# Patient Record
Sex: FEMALE | Race: BLACK OR AFRICAN AMERICAN | URBAN - METROPOLITAN AREA
[De-identification: names, ages, dates, MRNs, and addresses within clinical notes are randomized per-mention and may not be internally consistent; named-entity substitution may affect disease eponyms.]

---

## 2017-02-13 ENCOUNTER — OFFICE VISIT (OUTPATIENT)
Dept: FAMILY MEDICINE CLINIC | Age: 37
End: 2017-02-13

## 2017-02-13 VITALS
RESPIRATION RATE: 20 BRPM | SYSTOLIC BLOOD PRESSURE: 100 MMHG | DIASTOLIC BLOOD PRESSURE: 72 MMHG | HEIGHT: 58 IN | WEIGHT: 158.25 LBS | BODY MASS INDEX: 33.22 KG/M2 | TEMPERATURE: 98.2 F | OXYGEN SATURATION: 98 % | HEART RATE: 80 BPM

## 2017-02-13 DIAGNOSIS — J02.9 SORE THROAT: ICD-10-CM

## 2017-02-13 DIAGNOSIS — J40 BRONCHITIS: Primary | ICD-10-CM

## 2017-02-13 RX ORDER — BENZONATATE 100 MG/1
100 CAPSULE ORAL
Qty: 60 CAP | Refills: 1 | Status: SHIPPED | OUTPATIENT
Start: 2017-02-13 | End: 2017-02-20

## 2017-02-13 RX ORDER — LORATADINE 10 MG/1
10 TABLET ORAL DAILY
Qty: 30 TAB | Refills: 11 | Status: SHIPPED | OUTPATIENT
Start: 2017-02-13 | End: 2018-02-08

## 2017-02-13 RX ORDER — CEFDINIR 300 MG/1
300 CAPSULE ORAL 2 TIMES DAILY
Qty: 20 CAP | Refills: 0 | Status: SHIPPED | OUTPATIENT
Start: 2017-02-13 | End: 2017-02-23

## 2017-02-13 NOTE — MR AVS SNAPSHOT
Visit Information Date & Time Provider Department Dept. Phone Encounter #  
 2/13/2017 11:20 AM Mela Paiz MD [address] [facility] [phone] [id number] Follow-up Instructions Return if symptoms worsen or fail to improve. Upcoming Health Maintenance Date Due  
 PAP AKA CERVICAL CYTOLOGY 9/21/2003 INFLUENZA AGE 9 TO ADULT 8/1/2016 DTaP/Tdap/Td series (2 - Td) 9/16/2023 Allergies as of 2/13/2017  Review Complete On: 2/13/2017 By: Mela Paiz MD  
 No Known Allergies Current Immunizations  Reviewed on 11/25/2013 Name Date Tdap 9/16/2013 Not reviewed this visit You Were Diagnosed With   
  
 Codes Comments Bronchitis    -  Primary ICD-10-CM: A45 ICD-9-CM: 528 Sore throat     ICD-10-CM: J02.9 ICD-9-CM: 928 Vitals BP Pulse Temp Resp Height(growth percentile) Weight(growth percentile) 100/72 (BP 1 Location: Left arm, BP Patient Position: Sitting) 80 98.2 °F (36.8 °C) (Oral) 20 4' 10\" (1.473 m) 158 lb 4 oz (71.8 kg) LMP SpO2 BMI OB Status Smoking Status 01/30/2017 (Approximate) 98% 33.07 kg/m2 Having regular periods Never Smoker Vitals History BMI and BSA Data Body Mass Index Body Surface Area 33.07 kg/m 2 1.71 m 2 Preferred Pharmacy Pharmacy Name Phone [address] Your Updated Medication List  
  
   
This list is accurate as of: 2/13/17 11:51 AM.  Always use your most recent med list.  
  
  
  
  
 benzonatate 100 mg capsule Commonly known as:  TESSALON PERLES Take 1 Cap by mouth three (3) times daily as needed for Cough for up to 7 days. cefdinir 300 mg capsule Commonly known as:  OMNICEF Take 1 Cap by mouth two (2) times a day for 10 days. loratadine 10 mg tablet Commonly known as:  Michaell Organ Take 1 Tab by mouth daily for 360 days. promethazine-codeine 6.25-10 mg/5 mL syrup Commonly known as:  PHENERGAN with CODEINE Take 5 mL by mouth every six (6) hours as needed for Cough. Prescriptions Sent to Pharmacy Refills  
 benzonatate (TESSALON PERLES) 100 mg capsule 1 Sig: Take 1 Cap by mouth three (3) times daily as needed for Cough for up to 7 days. Class: Normal  
 Pharmacy: [address] Ph #: [phone] Route: Oral  
 cefdinir (OMNICEF) 300 mg capsule 0 Sig: Take 1 Cap by mouth two (2) times a day for 10 days. Class: Normal  
 Pharmacy: [address] Ph #: [phone] Route: Oral  
 loratadine (CLARITIN) 10 mg tablet 11 Sig: Take 1 Tab by mouth daily for 360 days. Class: Normal  
 Pharmacy: [address] Ph #: [phone] Route: Oral  
  
We Performed the Following AMB POC RAPID STREP A [75965 CPT(R)] Follow-up Instructions Return if symptoms worsen or fail to improve. Introducing [facility] & HEALTH SERVICES! [employer] introduces Gummii patient portal. Now you can access parts of your medical record, email your doctor's office, and request medication refills online. 1. In your internet browser, go to [email or web address] 2. Click on the First Time User? Click Here link in the Sign In box. You will see the New Member Sign Up page. 3. Enter your Gummii Access Code exactly as it appears below. You will not need to use this code after youve completed the sign-up process. If you do not sign up before the expiration date, you must request a new code. · Gummii Access Code: [email or web address] Expires: 5/14/2017 11:51 AM 
 
4. Enter the last four digits of your Social Security Number (xxxx) and Date of Birth (mm/dd/yyyy) as indicated and click Submit.  You will be taken to the next sign-up page. 5. Create a leaselock ID. This will be your leaselock login ID and cannot be changed, so think of one that is secure and easy to remember. 6. Create a leaselock password. You can change your password at any time. 7. Enter your Password Reset Question and Answer. This can be used at a later time if you forget your password. 8. Enter your e-mail address. You will receive e-mail notification when new information is available in [address] 9. Click Sign Up. You can now view and download portions of your medical record. 10. Click the Download Summary menu link to download a portable copy of your medical information. If you have questions, please visit the Frequently Asked Questions section of the leaselock website. Remember, leaselock is NOT to be used for urgent needs. For medical emergencies, dial 911. Now available from your iPhone and Android! Please provide this summary of care documentation to your next provider. Your primary care clinician is listed as SARAH RIGGS. If you have any questions after today's visit, please call [phone].

## 2017-02-13 NOTE — PROGRESS NOTES
1. Have you been to the ER, urgent care clinic since your last visit? Hospitalized since your last visit? No       2. Have you seen or consulted any other health care providers outside of the [address] [place] [address] since your last visit? Include any pap smears or colon screening. No  Chief Complaint   Patient presents with    Cold Symptoms     cough, cold symptoms fever of/on X couple days c/o sore throat & ears hurting     Chief Complaint   Patient presents with    Cold Symptoms     cough, cold symptoms fever of/on X couple days c/o sore throat & ears hurting     she is a 31y.o. year old female who presents for evalution. Reviewed PmHx, RxHx, FmHx, SocHx, AllgHx and updated and dated in the chart. Patient Active Problem List    Diagnosis    Down syndrome     Lives with mother         Review of Systems - negative except as listed above in the HPI    Objective:     Vitals:    02/13/17 1135   BP: 100/72   Pulse: 80   Resp: 20   Temp: 98.2 °F (36.8 °C)   TempSrc: Oral   SpO2: 98%   Weight: 158 lb 4 oz (71.8 kg)   Height: 4' 10\" (1.473 m)     Physical Examination: General appearance - alert, well appearing, and in no distress  Nose - purulent rhinorrhea  Chest - clear to auscultation, no wheezes, rales or rhonchi, symmetric air entry  Heart - normal rate, regular rhythm, normal S1, S2, no murmurs, rubs, clicks or gallops      Assessment/ Plan:   Hector Moore was seen today for cold symptoms. Diagnoses and all orders for this visit:    Bronchitis  -     benzonatate (TESSALON PERLES) 100 mg capsule; Take 1 Cap by mouth three (3) times daily as needed for Cough for up to 7 days. -     cefdinir (OMNICEF) 300 mg capsule; Take 1 Cap by mouth two (2) times a day for 10 days. -     loratadine (CLARITIN) 10 mg tablet; Take 1 Tab by mouth daily for 360 days. Sore throat  -     AMB POC RAPID STREP A-neg       Follow-up Disposition:  Return if symptoms worsen or fail to improve.     I have discussed the diagnosis with the patient and the intended plan as seen in the above orders. The patient understands and agrees with the plan. The patient has received an after-visit summary and questions were answered concerning future plans. Medication Side Effects and Warnings were discussed with patient  Patient Labs were reviewed and or requested:  Patient Past Records were reviewed and or requested    Cat Graves M.D. There are no Patient Instructions on file for this visit.

## 2017-03-23 ENCOUNTER — OFFICE VISIT (OUTPATIENT)
Dept: FAMILY MEDICINE CLINIC | Age: 37
End: 2017-03-23

## 2017-03-23 VITALS
DIASTOLIC BLOOD PRESSURE: 58 MMHG | HEART RATE: 76 BPM | RESPIRATION RATE: 18 BRPM | OXYGEN SATURATION: 98 % | TEMPERATURE: 98.2 F | HEIGHT: 58 IN | BODY MASS INDEX: 33.69 KG/M2 | WEIGHT: 160.5 LBS | SYSTOLIC BLOOD PRESSURE: 96 MMHG

## 2017-03-23 DIAGNOSIS — L21.9 SEBORRHEIC DERMATITIS: Primary | ICD-10-CM

## 2017-03-23 RX ORDER — KETOCONAZOLE 20 MG/ML
SHAMPOO TOPICAL
Qty: 1 BOTTLE | Refills: 3 | Status: SHIPPED | OUTPATIENT
Start: 2017-03-23 | End: 2018-09-19 | Stop reason: SDUPTHER

## 2017-03-23 NOTE — PROGRESS NOTES
Chief Complaint   Patient presents with    Hair/Scalp Problem     Dry Scalp/Itching Flaky Scalp     1. Have you been to the ER, urgent care clinic since your last visit? Hospitalized since your last visit? No    2. Have you seen or consulted any other health care providers outside of the [facility] since your last visit? Include any pap smears or colon screening. No  Chief Complaint   Patient presents with    Hair/Scalp Problem     Dry Scalp/Itching Flaky Scalp     she is a 31y.o. year old female who presents for evalution. Reviewed PmHx, RxHx, FmHx, SocHx, AllgHx and updated and dated in the chart. Patient Active Problem List    Diagnosis    Down syndrome     Lives with mother         Review of Systems - negative except as listed above in the HPI    Objective:     Vitals:    03/23/17 0821   BP: 96/58   Pulse: 76   Resp: 18   Temp: 98.2 °F (36.8 °C)   TempSrc: Oral   SpO2: 98%   Weight: 160 lb 8 oz (72.8 kg)   Height: 4' 10\" (1.473 m)     Physical Examination: General appearance - alert, well appearing, and in no distress  Skin - scalp with dry scaly skin      Assessment/ Plan:   Odilia Li was seen today for hair/scalp problem. Diagnoses and all orders for this visit:    Seborrheic dermatitis  -     ketoconazole (NIZORAL) 2 % shampoo; Apply to scalp daily and let stand for 10-15 min and then rinse       Follow-up Disposition:  Return if symptoms worsen or fail to improve. I have discussed the diagnosis with the patient and the intended plan as seen in the above orders. The patient understands and agrees with the plan. The patient has received an after-visit summary and questions were answered concerning future plans. Medication Side Effects and Warnings were discussed with patient  Patient Labs were reviewed and or requested:  Patient Past Records were reviewed and or requested    Lizzie Santos M.D. There are no Patient Instructions on file for this visit.

## 2017-03-23 NOTE — MR AVS SNAPSHOT
Visit Information Date & Time Provider Department Dept. Phone Encounter #  
 3/23/2017  7:45 AM Tania Herring MD [address] [facility] [phone] [id number] Follow-up Instructions Return if symptoms worsen or fail to improve. Upcoming Health Maintenance Date Due  
 PAP AKA CERVICAL CYTOLOGY 3/23/2020 DTaP/Tdap/Td series (2 - Td) 9/16/2023 Allergies as of 3/23/2017  Review Complete On: 3/23/2017 By: Tania Herring MD  
 No Known Allergies Current Immunizations  Reviewed on 11/25/2013 Name Date Tdap 9/16/2013 Not reviewed this visit You Were Diagnosed With   
  
 Codes Comments Seborrheic dermatitis    -  Primary ICD-10-CM: L21.9 ICD-9-CM: 690.10 Vitals BP Pulse Temp Resp Height(growth percentile) Weight(growth percentile) 96/58 76 98.2 °F (36.8 °C) (Oral) 18 4' 10\" (1.473 m) 160 lb 8 oz (72.8 kg) SpO2 BMI OB Status Smoking Status 98% 33.54 kg/m2 Having regular periods Never Smoker Vitals History BMI and BSA Data Body Mass Index Body Surface Area  
 33.54 kg/m 2 1.73 m 2 Preferred Pharmacy Pharmacy Name Phone [address] Your Updated Medication List  
  
   
This list is accurate as of: 3/23/17  8:37 AM.  Always use your most recent med list.  
  
  
  
  
 ketoconazole 2 % shampoo Commonly known as:  NIZORAL Apply to scalp daily and let stand for 10-15 min and then rinse  
  
 loratadine 10 mg tablet Commonly known as:  Ferrari Petri Take 1 Tab by mouth daily for 360 days. promethazine-codeine 6.25-10 mg/5 mL syrup Commonly known as:  PHENERGAN with CODEINE Take 5 mL by mouth every six (6) hours as needed for Cough. Prescriptions Sent to Pharmacy Refills  
 ketoconazole (NIZORAL) 2 % shampoo 3 Sig: Apply to scalp daily and let stand for 10-15 min and then rinse Class: Normal  
 Pharmacy: [facility] [address], [address] #: [phone] Follow-up Instructions Return if symptoms worsen or fail to improve. Introducing [facility] SERVICES! [employer]il[employer] introduces Trendzo patient portal. Now you can access parts of your medical record, email your doctor's office, and request medication refills online. 1. In your internet browser, go to [email or web address] Bake [email or web address] 2. Click on the First Time User? Click Here link in the Sign In box. You will see the New Member Sign Up page. 3. Enter your Trendzo Access Code exactly as it appears below. You will not need to use this code after youve completed the sign-up process. If you do not sign up before the expiration date, you must request a new code. · Trendzo Access Code: [email or web address] Expires: 5/14/2017 12:51 PM 
 
4. Enter the last four digits of your Social Security Number (xxxx) and Date of Birth (mm/dd/yyyy) as indicated and click Submit. You will be taken to the next sign-up page. 5. Create a Trendzo ID. This will be your Trendzo login ID and cannot be changed, so think of one that is secure and easy to remember. 6. Create a Trendzo password. You can change your password at any time. 7. Enter your Password Reset Question and Answer. This can be used at a later time if you forget your password. 8. Enter your e-mail address. You will receive e-mail notification when new information is available in [address] 9. Click Sign Up. You can now view and download portions of your medical record. 10. Click the Download Summary menu link to download a portable copy of your medical information. If you have questions, please visit the Frequently Asked Questions section of the Trendzo website. Remember, Trendzo is NOT to be used for urgent needs. For medical emergencies, dial 911. Now available from your iPhone and Android! Please provide this summary of care documentation to your next provider. Your primary care clinician is listed as SARAH RIGGS. If you have any questions after today's visit, please call [phone].

## 2017-03-28 ENCOUNTER — TELEPHONE (OUTPATIENT)
Dept: FAMILY MEDICINE CLINIC | Age: 37
End: 2017-03-28

## 2017-03-28 ENCOUNTER — DOCUMENTATION ONLY (OUTPATIENT)
Dept: FAMILY MEDICINE CLINIC | Age: 37
End: 2017-03-28

## 2017-03-28 NOTE — TELEPHONE ENCOUNTER
[address] would states that Nizoral shampoo is usually used upto 3 times/week. Would you like to change directions?

## 2018-09-19 ENCOUNTER — OFFICE VISIT (OUTPATIENT)
Dept: FAMILY MEDICINE CLINIC | Age: 38
End: 2018-09-19

## 2018-09-19 VITALS
BODY MASS INDEX: 36.31 KG/M2 | OXYGEN SATURATION: 95 % | TEMPERATURE: 98.5 F | HEIGHT: 58 IN | HEART RATE: 75 BPM | RESPIRATION RATE: 18 BRPM | DIASTOLIC BLOOD PRESSURE: 79 MMHG | SYSTOLIC BLOOD PRESSURE: 110 MMHG | WEIGHT: 173 LBS

## 2018-09-19 DIAGNOSIS — L21.9 SEBORRHEIC DERMATITIS: Primary | ICD-10-CM

## 2018-09-19 DIAGNOSIS — J06.9 UPPER RESPIRATORY TRACT INFECTION, UNSPECIFIED TYPE: ICD-10-CM

## 2018-09-19 DIAGNOSIS — Q90.9 DOWN SYNDROME: ICD-10-CM

## 2018-09-19 RX ORDER — LORATADINE 10 MG/1
10 TABLET ORAL DAILY
Qty: 30 TAB | Refills: 11 | Status: SHIPPED | OUTPATIENT
Start: 2018-09-19 | End: 2018-10-02

## 2018-09-19 RX ORDER — AZITHROMYCIN 250 MG/1
TABLET, FILM COATED ORAL
Qty: 6 TAB | Refills: 0 | Status: SHIPPED | OUTPATIENT
Start: 2018-09-19 | End: 2018-10-02

## 2018-09-19 RX ORDER — KETOCONAZOLE 20 MG/ML
SHAMPOO TOPICAL
Qty: 1 BOTTLE | Refills: 3 | Status: SHIPPED | OUTPATIENT
Start: 2018-09-19

## 2018-09-19 NOTE — MR AVS SNAPSHOT
[address] [address] [phone] Patient: Jennifer Montoya MRN:  [id number] Visit Information Date & Time Provider Department Dept. Phone Encounter #  
 9/19/2018  3:00 PM Little Rust MD 5900 [facility] [phone] [id number] Follow-up Instructions Return if symptoms worsen or fail to improve. Your Appointments 10/25/2018  8:00 AM  
PHYSICAL PRE OP with Little Rust MD  
[facility] App[facility] Note: CPE [facility] 09/19/2018  
 [address] [place] [address] [address] [address] [phone]  
  
   
 [address] [place] [address] [address] [address] Upcoming Health Maintenance Date Due Influenza Age 5 to Adult 4/30/2019* PAP AKA CERVICAL CYTOLOGY 3/23/2020 DTaP/Tdap/Td series (2 - Td) 9/16/2023 *Topic was postponed. The date shown is not the original due date. Allergies as of 9/19/2018  Review Complete On: 9/19/2018 By: Little Rust MD  
 No Known Allergies Current Immunizations  Reviewed on 11/25/2013 Name Date Tdap 9/16/2013 Not reviewed this visit You Were Diagnosed With   
  
 Codes Comments Seborrheic dermatitis    -  Primary ICD-10-CM: L21.9 ICD-9-CM: 690.10 Upper respiratory tract infection, unspecified type     ICD-10-CM: J06.9 ICD-9-CM: 465.9 Down syndrome     ICD-10-CM: Q90.9 ICD-9-CM: 123. 0 Vitals BP Pulse Temp Resp Height(growth percentile) Weight(growth percentile) 110/79 75 98.5 °F (36.9 °C) 18 4' 10\" (1.473 m) 173 lb (78.5 kg) SpO2 BMI OB Status Smoking Status 95% 36.16 kg/m2 Having regular periods Never Smoker Vitals History BMI and BSA Data Body Mass Index Body Surface Area  
 36.16 kg/m 2 1.79 m 2 Preferred Pharmacy Pharmacy Name Phone [address], [address] [facility] [address] [facility] [phone] Your Updated Medication List  
  
   
This list is accurate as of 9/19/18  3:59 PM.  Always use your most recent med list.  
  
  
  
  
 azithromycin 250 mg tablet Commonly known as:  Shona Friend Take two tablets today then one tablet daily  
  
 ketoconazole 2 % shampoo Commonly known as:  NIZORAL Apply to scalp daily and let stand for 10-15 min and then rinse  
  
 loratadine 10 mg tablet Commonly known as:  Mariana Dustman Take 1 Tab by mouth daily for 360 days. Prescriptions Sent to Pharmacy Refills  
 ketoconazole (NIZORAL) 2 % shampoo 3 Sig: Apply to scalp daily and let stand for 10-15 min and then rinse Class: Normal  
 Pharmacy: [facility] [address], [address] Ph #: [phone]  
 azithromycin (ZITHROMAX) 250 mg tablet 0 Sig: Take two tablets today then one tablet daily Class: Normal  
 Pharmacy: [facility] [address] [place] [address] Ph #: [phone]  
 loratadine (CLARITIN) 10 mg tablet 11 Sig: Take 1 Tab by mouth daily for 360 days. Class: Normal  
 Pharmacy: [facility] [address], [address] Ph #: [phone] Route: Oral  
  
Follow-up Instructions Return if symptoms worsen or fail to improve. Introducing [facility] & HEALTH SERVICES! [facility] introduces Classical Connection patient portal. Now you can access parts of your medical record, email your doctor's office, and request medication refills online. 1. In your internet browser, go to [email or web address] 2. Click on the First Time User? Click Here link in the Sign In box. You will see the New Member Sign Up page. 3. Enter your Classical Connection Access Code exactly as it appears below. You will not need to use this code after youve completed the sign-up process. If you do not sign up before the expiration date, you must request a new code. · Matchbin Access Code: [email or web address] Expires: 12/18/2018  3:58 PM 
 
4. Enter the last four digits of your Social Security Number (xxxx) and Date of Birth (mm/dd/yyyy) as indicated and click Submit. You will be taken to the next sign-up page. 5. Create a Matchbin ID. This will be your Matchbin login ID and cannot be changed, so think of one that is secure and easy to remember. 6. Create a Matchbin password. You can change your password at any time. 7. Enter your Password Reset Question and Answer. This can be used at a later time if you forget your password. 8. Enter your e-mail address. You will receive e-mail notification when new information is available in [address] 9. Click Sign Up. You can now view and download portions of your medical record. 10. Click the Download Summary menu link to download a portable copy of your medical information. If you have questions, please visit the Frequently Asked Questions section of the Matchbin website. Remember, Matchbin is NOT to be used for urgent needs. For medical emergencies, dial 911. Now available from your iPhone and Android! Please provide this summary of care documentation to your next provider. Your primary care clinician is listed as SARAH RIGGS. If you have any questions after today's visit, please call [phone].

## 2018-09-19 NOTE — PROGRESS NOTES
Patient here for cough x 6 months and scalp issues,. Shampoo refill needed. 1. Have you been to the ER, urgent care clinic since your last visit? Hospitalized since your last visit? No 
 
2. Have you seen or consulted any other health care providers outside of the [facility] since your last visit? Include any pap smears or colon screening. No  
 
 
Chief Complaint Patient presents with  Cough  
  cough  Hair/Scalp Problem  
  med refill She is a 30 y.o. female who presents for evalution. Reviewed PmHx, RxHx, FmHx, SocHx, AllgHx and updated and dated in the chart. Patient Active Problem List  
 Diagnosis  Down syndrome Lives with mother Review of Systems - negative except as listed above in the HPI Objective:  
 
Vitals:  
 09/19/18 1540 BP: 110/79 Pulse: 75 Resp: 18 Temp: 98.5 °F (36.9 °C) SpO2: 95% Weight: 173 lb (78.5 kg) Height: 4' 10\" (1.473 m) Physical Examination: General appearance - alert, well appearing, and in no distress Neck - supple, no significant adenopathy Chest - clear to auscultation, no wheezes, rales or rhonchi, symmetric air entry Heart - normal rate, regular rhythm, normal S1, S2, no murmurs, rubs, clicks or gallops Abdomen - soft, nontender, nondistended, no masses or organomegaly Assessment/ Plan:  
Diagnoses and all orders for this visit: 1. Seborrheic dermatitis 
-     ketoconazole (NIZORAL) 2 % shampoo; Apply to scalp daily and let stand for 10-15 min and then rinse 2. Upper respiratory tract infection, unspecified type 
-     azithromycin (ZITHROMAX) 250 mg tablet; Take two tablets today then one tablet daily -     loratadine (CLARITIN) 10 mg tablet; Take 1 Tab by mouth daily for 360 days. 3. Down syndrome 
-stable Follow-up Disposition: 
Return if symptoms worsen or fail to improve.  
 
I have discussed the diagnosis with the patient and the intended plan as seen in the above orders. The patient understands and agrees with the plan. The patient has received an after-visit summary and questions were answered concerning future plans. Medication Side Effects and Warnings were discussed with patient Patient Labs were reviewed and or requested: 
Patient Past Records were reviewed and or requested Kemi Tidwell M.D. There are no Patient Instructions on file for this visit.

## 2018-09-28 ENCOUNTER — TELEPHONE (OUTPATIENT)
Dept: FAMILY MEDICINE CLINIC | Age: 38
End: 2018-09-28

## 2018-09-28 ENCOUNTER — OFFICE VISIT (OUTPATIENT)
Dept: FAMILY MEDICINE CLINIC | Age: 38
End: 2018-09-28

## 2018-09-28 VITALS
HEIGHT: 58 IN | OXYGEN SATURATION: 94 % | RESPIRATION RATE: 24 BRPM | TEMPERATURE: 98.4 F | SYSTOLIC BLOOD PRESSURE: 121 MMHG | HEART RATE: 92 BPM | BODY MASS INDEX: 36.73 KG/M2 | DIASTOLIC BLOOD PRESSURE: 64 MMHG | WEIGHT: 175 LBS

## 2018-09-28 DIAGNOSIS — J02.9 SORE THROAT: ICD-10-CM

## 2018-09-28 DIAGNOSIS — J06.9 VIRAL URI WITH COUGH: ICD-10-CM

## 2018-09-28 DIAGNOSIS — R42 DIZZINESS: Primary | ICD-10-CM

## 2018-09-28 DIAGNOSIS — R07.81 PLEURITIC CHEST PAIN: ICD-10-CM

## 2018-09-28 DIAGNOSIS — R12 HEARTBURN: ICD-10-CM

## 2018-09-28 PROBLEM — E66.01 SEVERE OBESITY (BMI 35.0-39.9): Status: ACTIVE | Noted: 2018-09-28

## 2018-09-28 RX ORDER — BENZONATATE 200 MG/1
200 CAPSULE ORAL
Qty: 30 CAP | Refills: 0 | Status: SHIPPED | OUTPATIENT
Start: 2018-09-28 | End: 2018-10-05

## 2018-09-28 RX ORDER — GUAIFENESIN 600 MG/1
600 TABLET, EXTENDED RELEASE ORAL 2 TIMES DAILY
Qty: 14 TAB | Refills: 0 | Status: SHIPPED | OUTPATIENT
Start: 2018-09-28 | End: 2019-07-08 | Stop reason: ALTCHOICE

## 2018-09-28 RX ORDER — RANITIDINE 150 MG/1
150 TABLET, FILM COATED ORAL 2 TIMES DAILY
Qty: 30 TAB | Refills: 0 | Status: SHIPPED | OUTPATIENT
Start: 2018-09-28 | End: 2019-07-08 | Stop reason: ALTCHOICE

## 2018-09-28 RX ORDER — FLUTICASONE PROPIONATE 50 MCG
2 SPRAY, SUSPENSION (ML) NASAL DAILY
Qty: 1 BOTTLE | Refills: 0 | Status: SHIPPED | OUTPATIENT
Start: 2018-09-28 | End: 2018-09-28 | Stop reason: SDUPTHER

## 2018-09-28 RX ORDER — FLUTICASONE PROPIONATE 50 MCG
2 SPRAY, SUSPENSION (ML) NASAL DAILY
Qty: 1 BOTTLE | Refills: 0 | Status: SHIPPED | OUTPATIENT
Start: 2018-09-28 | End: 2021-04-21 | Stop reason: SDUPTHER

## 2018-09-28 RX ORDER — RANITIDINE 150 MG/1
150 TABLET, FILM COATED ORAL 2 TIMES DAILY
Qty: 30 TAB | Refills: 0 | Status: SHIPPED | OUTPATIENT
Start: 2018-09-28 | End: 2018-09-28 | Stop reason: SDUPTHER

## 2018-09-28 NOTE — TELEPHONE ENCOUNTER
Pharmacy states they do not make generic Mucinex anymore, and they have a temporary back order on Mucinex. They need a substitution please. Thanks!

## 2018-09-28 NOTE — PATIENT INSTRUCTIONS
Sore Throat: Care Instructions  Your Care Instructions    Infection by bacteria or a virus causes most sore throats. Cigarette smoke, dry air, air pollution, allergies, and yelling can also cause a sore throat. Sore throats can be painful and annoying. Fortunately, most sore throats go away on their own. If you have a bacterial infection, your doctor may prescribe antibiotics. Follow-up care is a key part of your treatment and safety. Be sure to make and go to all appointments, and call your doctor if you are having problems. It's also a good idea to know your test results and keep a list of the medicines you take. How can you care for yourself at home? · If your doctor prescribed antibiotics, take them as directed. Do not stop taking them just because you feel better. You need to take the full course of antibiotics. · Gargle with warm salt water once an hour to help reduce swelling and relieve discomfort. Use 1 teaspoon of salt mixed in 1 cup of warm water. · Take an over-the-counter pain medicine, such as acetaminophen (Tylenol), ibuprofen (Advil, Motrin), or naproxen (Aleve). Read and follow all instructions on the label. · Be careful when taking over-the-counter cold or flu medicines and Tylenol at the same time. Many of these medicines have acetaminophen, which is Tylenol. Read the labels to make sure that you are not taking more than the recommended dose. Too much acetaminophen (Tylenol) can be harmful. · Drink plenty of fluids. Fluids may help soothe an irritated throat. Hot fluids, such as tea or soup, may help decrease throat pain. · Use over-the-counter throat lozenges to soothe pain. Regular cough drops or hard candy may also help. These should not be given to young children because of the risk of choking. · Do not smoke or allow others to smoke around you. If you need help quitting, talk to your doctor about stop-smoking programs and medicines.  These can increase your chances of quitting for good. · Use a vaporizer or humidifier to add moisture to your bedroom. Follow the directions for cleaning the machine. When should you call for help? Call your doctor now or seek immediate medical care if:    · You have new or worse trouble swallowing.     · Your sore throat gets much worse on one side.    Watch closely for changes in your health, and be sure to contact your doctor if you do not get better as expected. Where can you learn more? Go to http://vicky-walter.info/. Enter 062 [id number] 80 [id number] in the search box to learn more about \"Sore Throat: Care Instructions. \"  Current as of: May 12, 2017  Content Version: 11.7  © 3536-7479 [employer]. Care instructions adapted under license by Inter[employer] (which disclaims liability or warranty for this information). If you have questions about a medical condition or this instruction, always ask your healthcare professional. [place] [address] any warranty or liability for your use of this information. Viral Infections: Care Instructions  Your Care Instructions    You don't feel well, but it's not clear what's causing it. You may have a viral infection. Viruses cause many illnesses, such as the common cold, influenza, fever, rashes, and the diarrhea, nausea, and vomiting that are often called \"stomach flu. \" You may wonder if antibiotic medicines could make you feel better. But antibiotics only treat infections caused by bacteria. They don't work on viruses. The good news is that viral infections usually aren't serious. Most will go away in a few days without medical treatment. In the meantime, there are a few things you can do to make yourself more comfortable. Follow-up care is a key part of your treatment and safety. Be sure to make and go to all appointments, and call your doctor if you are having problems. It's also a good idea to know your test results and keep a list of the medicines you take.   How can you care for yourself at home? · Get plenty of rest if you feel tired. · Take an over-the-counter pain medicine if needed, such as acetaminophen (Tylenol), ibuprofen (Advil, Motrin), or naproxen (Aleve). Read and follow all instructions on the label. · Be careful when taking over-the-counter cold or flu medicines and Tylenol at the same time. Many of these medicines have acetaminophen, which is Tylenol. Read the labels to make sure that you are not taking more than the recommended dose. Too much acetaminophen (Tylenol) can be harmful. · Drink plenty of fluids, enough so that your urine is light yellow or clear like water. If you have kidney, heart, or liver disease and have to limit fluids, talk with your doctor before you increase the amount of fluids you drink. · Stay home from work, school, and other public places while you have a fever. When should you call for help? Call 911 anytime you think you may need emergency care. For example, call if:    · You have severe trouble breathing.     · You passed out (lost consciousness).    Call your doctor now or seek immediate medical care if:    · You seem to be getting much sicker.     · You have a new or higher fever.     · You have blood in your stools.     · You have new belly pain, or your pain gets worse.     · You have a new rash.    Watch closely for changes in your health, and be sure to contact your doctor if:    · You start to get better and then get worse.     · You do not get better as expected. Where can you learn more? Go to http://vicky-walter.info/. Enter R510 in the search box to learn more about \"Viral Infections: Care Instructions. \"  Current as of: November 18, 2017  Content Version: 11.7  © 5564-0353 [employer]. Care instructions adapted under license by Torqeedo (which disclaims liability or warranty for this information).  If you have questions about a medical condition or this instruction, always ask your healthcare professional. [place] [address] any warranty or liability for your use of this information.     Chloraseptic spray for throat

## 2018-09-28 NOTE — MR AVS SNAPSHOT
[address] [address] 
[phone] Patient: Jose C Vincent MRN: [id number] Visit Information Date & Time Provider Department Dept. Phone Encounter #  
 9/28/2018 11:00 AM Ginger Mccormick  [facility] [phone] [id number] Follow-up Instructions Return in about 1 week (around 10/5/2018) for routine follow up, sooner , if symptoms fail to improve. Upcoming Health Maintenance Date Due Influenza Age 5 to Adult 4/30/2019* PAP AKA CERVICAL CYTOLOGY 3/23/2020 DTaP/Tdap/Td series (2 - Td) 9/16/2023 *Topic was postponed. The date shown is not the original due date. Allergies as of 9/28/2018  Review Complete On: 9/28/2018 By: Ginger Mccormick MD  
 No Known Allergies Current Immunizations  Reviewed on 11/25/2013 Name Date Tdap 9/16/2013 Not reviewed this visit You Were Diagnosed With   
  
 Codes Comments Dizziness    -  Primary ICD-10-CM: Y15 ICD-9-CM: 780.4 Pleuritic chest pain     ICD-10-CM: R07.81 ICD-9-CM: 786.52 Sore throat     ICD-10-CM: J02.9 ICD-9-CM: 128 Heartburn     ICD-10-CM: R12 
ICD-9-CM: 787.1 Vitals Pulse Temp Resp Height(growth percentile) Weight(growth percentile) LMP  
 92 98.4 °F (36.9 °C) (Oral) 24 4' 9.6\" (1.463 m) 175 lb (79.4 kg) 09/22/2018 SpO2 BMI OB Status Smoking Status 94% 37.08 kg/m2 Having regular periods Never Smoker Vitals History BMI and BSA Data Body Mass Index Body Surface Area 37.08 kg/m 2 1.8 m 2 Preferred Pharmacy Pharmacy Name Phone [facility], [address] [facility] [phone] Your Updated Medication List  
  
   
This list is accurate as of 9/28/18 12:24 PM.  Always use your most recent med list.  
  
  
  
  
 azithromycin 250 mg tablet Commonly known as:  [place] Ren Take two tablets today then one tablet daily  
  
 fluticasone 50 mcg/actuation nasal spray Commonly known as:  [place] Ly 2 Sprays by Both Nostrils route daily. ketoconazole 2 % shampoo Commonly known as:  NIZORAL Apply to scalp daily and let stand for 10-15 min and then rinse  
  
 loratadine 10 mg tablet Commonly known as:  [place] Standing Take 1 Tab by mouth daily for 360 days. raNITIdine 150 mg tablet Commonly known as:  ZANTAC Take 1 Tab by mouth two (2) times a day. Prescriptions Sent to Pharmacy Refills  
 raNITIdine (ZANTAC) 150 mg tablet 0 Sig: Take 1 Tab by mouth two (2) times a day. Class: Normal  
 Pharmacy: [facility] [address], [address] Ph #: [phone] Route: Oral  
 fluticasone (FLONASE) 50 mcg/actuation nasal spray 0 Si Sprays by Both Nostrils route daily. Class: Normal  
 Pharmacy: [facility] [address], [address] Ph #: [phone] Route: Both Nostrils Follow-up Instructions Return in about 1 week (around 10/5/2018) for routine follow up, sooner , if symptoms fail to improve. To-Do List   
 2018 Imaging:  XR CHEST PA LAT Patient Instructions Sore Throat: Care Instructions Your Care Instructions Infection by bacteria or a virus causes most sore throats. Cigarette smoke, dry air, air pollution, allergies, and yelling can also cause a sore throat. Sore throats can be painful and annoying. Fortunately, most sore throats go away on their own. If you have a bacterial infection, your doctor may prescribe antibiotics. Follow-up care is a key part of your treatment and safety. Be sure to make and go to all appointments, and call your doctor if you are having problems. It's also a good idea to know your test results and keep a list of the medicines you take. How can you care for yourself at home? · If your doctor prescribed antibiotics, take them as directed. Do not stop taking them just because you feel better. You need to take the full course of antibiotics. · Gargle with warm salt water once an hour to help reduce swelling and relieve discomfort. Use 1 teaspoon of salt mixed in 1 cup of warm water. · Take an over-the-counter pain medicine, such as acetaminophen (Tylenol), ibuprofen (Advil, Motrin), or naproxen (Aleve). Read and follow all instructions on the label. · Be careful when taking over-the-counter cold or flu medicines and Tylenol at the same time. Many of these medicines have acetaminophen, which is Tylenol. Read the labels to make sure that you are not taking more than the recommended dose. Too much acetaminophen (Tylenol) can be harmful. · Drink plenty of fluids. Fluids may help soothe an irritated throat. Hot fluids, such as tea or soup, may help decrease throat pain. · Use over-the-counter throat lozenges to soothe pain. Regular cough drops or hard candy may also help. These should not be given to young children because of the risk of choking. · Do not smoke or allow others to smoke around you. If you need help quitting, talk to your doctor about stop-smoking programs and medicines. These can increase your chances of quitting for good. · Use a vaporizer or humidifier to add moisture to your bedroom. Follow the directions for cleaning the machine. When should you call for help? Call your doctor now or seek immediate medical care if: 
  · You have new or worse trouble swallowing.  
  · Your sore throat gets much worse on one side.  
 Watch closely for changes in your health, and be sure to contact your doctor if you do not get better as expected. Where can you learn more? Go to http://vicky-walter.info/. Enter [id number] [id number] in the search box to learn more about \"Sore Throat: Care Instructions. \" Current as of: May 12, 2017 Content Version: 11.7 © 2160-6804 Healthwise, [employer]uj[employer]. Care instructions adapted under license by PBworks (which disclaims liability or warranty for this information). If you have questions about a medical condition or this instruction, always ask your healthcare professional. [address] any warranty or liability for your use of this information. Viral Infections: Care Instructions Your Care Instructions You don't feel well, but it's not clear what's causing it. You may have a viral infection. Viruses cause many illnesses, such as the common cold, influenza, fever, rashes, and the diarrhea, nausea, and vomiting that are often called \"stomach flu. \" You may wonder if antibiotic medicines could make you feel better. But antibiotics only treat infections caused by bacteria. They don't work on viruses. The good news is that viral infections usually aren't serious. Most will go away in a few days without medical treatment. In the meantime, there are a few things you can do to make yourself more comfortable. Follow-up care is a key part of your treatment and safety. Be sure to make and go to all appointments, and call your doctor if you are having problems. It's also a good idea to know your test results and keep a list of the medicines you take. How can you care for yourself at home? · Get plenty of rest if you feel tired. · Take an over-the-counter pain medicine if needed, such as acetaminophen (Tylenol), ibuprofen (Advil, Motrin), or naproxen (Aleve). Read and follow all instructions on the label. · Be careful when taking over-the-counter cold or flu medicines and Tylenol at the same time. Many of these medicines have acetaminophen, which is Tylenol. Read the labels to make sure that you are not taking more than the recommended dose. Too much acetaminophen (Tylenol) can be harmful.  
· Drink plenty of fluids, enough so that your urine is light yellow or clear like water. If you have kidney, heart, or liver disease and have to limit fluids, talk with your doctor before you increase the amount of fluids you drink. · Stay home from work, school, and other public places while you have a fever. When should you call for help? Call 911 anytime you think you may need emergency care. For example, call if: 
  · You have severe trouble breathing.  
  · You passed out (lost consciousness).  
 Call your doctor now or seek immediate medical care if: 
  · You seem to be getting much sicker.  
  · You have a new or higher fever.  
  · You have blood in your stools.  
  · You have new belly pain, or your pain gets worse.  
  · You have a new rash.  
 Watch closely for changes in your health, and be sure to contact your doctor if: 
  · You start to get better and then get worse.  
  · You do not get better as expected. Where can you learn more? Go to http://vicky-walter.info/. Enter U767 in the search box to learn more about \"Viral Infections: Care Instructions. \" Current as of: November 18, 2017 Content Version: 11.7 © 4572-1237 Geothermal International. Care instructions adapted under license by Ripstone (which disclaims liability or warranty for this information). If you have questions about a medical condition or this instruction, always ask your healthcare professional. [address] any warranty or liability for your use of this information. Chloraseptic spray for throat Introducing [facility]! Uma Lagos introduces FurnÃ©sh patient portal. Now you can access parts of your medical record, email your doctor's office, and request medication refills online. 1. In your internet browser, go to https://Open Source Food. SeeMore Interactive/Open Source Food 2. Click on the First Time User? Click Here link in the Sign In box. You will see the New Member Sign Up page. 3. Enter your GlobalTranz Access Code exactly as it appears below. You will not need to use this code after youve completed the sign-up process. If you do not sign up before the expiration date, you must request a new code. · GlobalTranz Access Code: [email or web address] Expires: 12/18/2018  3:58 PM 
 
4. Enter the last four digits of your Social Security Number (xxxx) and Date of Birth (mm/dd/yyyy) as indicated and click Submit. You will be taken to the next sign-up page. 5. Create a Peer.imt ID. This will be your GlobalTranz login ID and cannot be changed, so think of one that is secure and easy to remember. 6. Create a GlobalTranz password. You can change your password at any time. 7. Enter your Password Reset Question and Answer. This can be used at a later time if you forget your password. 8. Enter your e-mail address. You will receive e-mail notification when new information is available in [address]. 9. Click Sign Up. You can now view and download portions of your medical record. 10. Click the Download Summary menu link to download a portable copy of your medical information. If you have questions, please visit the Frequently Asked Questions section of the GlobalTranz website. Remember, GlobalTranz is NOT to be used for urgent needs. For medical emergencies, dial 911. Now available from your iPhone and Android! Please provide this summary of care documentation to your next provider. Your primary care clinician is listed as SARAH RIGGS. If you have any questions after today's visit, please call [phone].

## 2018-09-28 NOTE — PROGRESS NOTES
[address] [address] Residency Program,    [address]    Affiliated with [facility] and [facility] Note   Subjective:   Carlyle Marinelli is a 41 y.o. female presents for evaluation of body aches,n/v,   CC: \" Dizziness, vomiting   History provided by patient's sister     HPI:    Starting four days ago, pt began complaining of dizziness, epigastric abdominal pain,heartburn, headache, sleeping more than usual. Pt states it hurts to breath deep. Pt is taking po intake but not eating as much as usual.   Pt just finished a zpack a little over a week ago for symptoms of intermittent productive cough for the past 6 months. Pt lives with her sister. Denies smoke exposure, hx of seasonal allergies,fevers, chills,n/v,diarrhea or constipation. Pt is not sexually active. Patient's last menstrual period was 09/22/2018. Current Outpatient Prescriptions on File Prior to Visit   Medication Sig Dispense Refill    ketoconazole (NIZORAL) 2 % shampoo Apply to scalp daily and let stand for 10-15 min and then rinse 1 Bottle 3    azithromycin (ZITHROMAX) 250 mg tablet Take two tablets today then one tablet daily 6 Tab 0    loratadine (CLARITIN) 10 mg tablet Take 1 Tab by mouth daily for 360 days. 30 Tab 11     No current facility-administered medications on file prior to visit. Past Medical History:   Diagnosis Date    Down syndrome 20       Social History     Social History    Marital status: SINGLE     Spouse name: N/A    Number of children: N/A    Years of education: N/A     Occupational History    Not on file. Social History Main Topics    Smoking status: Never Smoker    Smokeless tobacco: Never Used    Alcohol use No    Drug use: No    Sexual activity: No     Other Topics Concern    Not on file     Social History Narrative       Review of Systems   Constitutional: Negative for chills and fever. Respiratory: Positive for cough and sputum production.  Negative for hemoptysis, shortness of breath and wheezing. Cardiovascular: Negative for chest pain. Gastrointestinal: Positive for abdominal pain and heartburn. Negative for constipation, nausea and vomiting. Genitourinary: Negative for dysuria. Musculoskeletal: Negative for myalgias. Skin: Negative for itching and rash. Neurological: Negative for dizziness, tingling, sensory change, focal weakness and headaches. Objective:     Visit Vitals    /64    Pulse 92    Temp 98.4 °F (36.9 °C) (Oral)    Resp 24    Ht 4' 9.6\" (1.463 m)    Wt 175 lb (79.4 kg)    LMP 09/22/2018    SpO2 94%    BMI 37.08 kg/m2      Physical Exam:  Physical Exam   Constitutional: She is oriented to person, place, and time. She appears well-developed and well-nourished. HENT:   Head: Normocephalic and atraumatic. Eyes: Conjunctivae are normal. Pupils are equal, round, and reactive to light. Right eye exhibits no discharge. Left eye exhibits no discharge. No scleral icterus. Neck: Normal range of motion. Neck supple. Cardiovascular: Normal rate, regular rhythm, normal heart sounds and intact distal pulses. Exam reveals no gallop and no friction rub. No murmur heard. Pulmonary/Chest: Effort normal and breath sounds normal. No respiratory distress. She has no wheezes. She has no rales. She exhibits no tenderness. Abdominal: Soft. Bowel sounds are normal. There is no tenderness. Musculoskeletal: Normal range of motion. She exhibits no edema, tenderness or deformity. Neurological: She is alert and oriented to person, place, and time. No cranial nerve deficit. Skin: Skin is warm and dry. No rash noted. No erythema. Nursing note and vitals reviewed. Chest xray : with no acute abnormalities  I personally reviewed this chest xray. Assessment and orders:       ICD-10-CM ICD-9-CM    1. Dizziness R42 780.4    2. Pleuritic chest pain R07.81 786.52    3.  Sore throat J02.9 462 benzonatate (TESSALON) 200 mg capsule guaiFENesin ER (MUCINEX) 600 mg ER tablet   4. Heartburn R12 787.1 benzonatate (TESSALON) 200 mg capsule      raNITIdine (ZANTAC) 150 mg tablet      DISCONTINUED: raNITIdine (ZANTAC) 150 mg tablet   5. Viral URI with cough J06.9 465.9 XR CHEST PA LAT    B97.89  benzonatate (TESSALON) 200 mg capsule      guaiFENesin ER (MUCINEX) 600 mg ER tablet      fluticasone (FLONASE) 50 mcg/actuation nasal spray      guaiFENesin-dextromethorphan SR (HUMIBID DM) 600-30 mg per tablet     Diagnoses and all orders for this visit:    1. Dizziness    2. Pleuritic chest pain    3. Sore throat  -     benzonatate (TESSALON) 200 mg capsule; Take 1 Cap by mouth three (3) times daily as needed for Cough for up to 7 days.  -     guaiFENesin ER (MUCINEX) 600 mg ER tablet; Take 1 Tab by mouth two (2) times a day. 4. Heartburn  -     benzonatate (TESSALON) 200 mg capsule; Take 1 Cap by mouth three (3) times daily as needed for Cough for up to 7 days. -     raNITIdine (ZANTAC) 150 mg tablet; Take 1 Tab by mouth two (2) times a day. 5. Viral URI with cough  -     XR CHEST PA LAT; Future  -     benzonatate (TESSALON) 200 mg capsule; Take 1 Cap by mouth three (3) times daily as needed for Cough for up to 7 days.  -     guaiFENesin ER (MUCINEX) 600 mg ER tablet; Take 1 Tab by mouth two (2) times a day. -     fluticasone (FLONASE) 50 mcg/actuation nasal spray; 2 Sprays by Both Nostrils route daily.  -     guaiFENesin-dextromethorphan SR (HUMIBID DM) 600-30 mg per tablet; Take 1 Tab by mouth two (2) times a day. Follow-up Disposition:  Return in about 1 week (around 10/5/2018) for routine follow up, sooner , if symptoms fail to improve. I have reviewed patient medical and social history and medications. I have reviewed pertinent labs results and other data. I have discussed the diagnosis with the patient and the intended plan as seen in the above orders.  The patient has received an after-visit summary and questions were answered concerning future plans. I have discussed medication side effects and warnings with the patient as well.     Ivonne Butt MD  Resident [facility]  10/01/18

## 2018-09-28 NOTE — PROGRESS NOTES
1. Have you been to the ER, urgent care clinic, or been hospitalized since your last visit? 2. Have you seen or consulted any other health care providers outside of the [address] [place] [address] since your last visit? Include any pap smears or colon screening. Reviewed record in preparation for visit and have necessary documentation  Pt did not bring medication to office visit for review  Information was given to pt on Advanced Directives, Living Will  Goals that were addressed and/or need to be completed during or after this appointment include       There are no preventive care reminders to display for this patient.

## 2018-10-04 ENCOUNTER — OFFICE VISIT (OUTPATIENT)
Dept: FAMILY MEDICINE CLINIC | Age: 38
End: 2018-10-04

## 2018-10-04 VITALS
SYSTOLIC BLOOD PRESSURE: 110 MMHG | BODY MASS INDEX: 37.11 KG/M2 | RESPIRATION RATE: 18 BRPM | OXYGEN SATURATION: 97 % | WEIGHT: 172 LBS | HEIGHT: 57 IN | HEART RATE: 76 BPM | DIASTOLIC BLOOD PRESSURE: 78 MMHG | TEMPERATURE: 98 F

## 2018-10-04 DIAGNOSIS — B34.9 VIRAL SYNDROME: Primary | ICD-10-CM

## 2018-10-04 DIAGNOSIS — Z09 FOLLOW UP: ICD-10-CM

## 2018-10-04 DIAGNOSIS — Z67.90 UNSPECIFIED BLOOD TYPE, RH POSITIVE: ICD-10-CM

## 2018-10-04 NOTE — PROGRESS NOTES
1. Have you been to the ER, urgent care clinic since your last visit? Hospitalized since your last visit? No 
 
2. Have you seen or consulted any other health care providers outside of the [address] [place] [address] since your last visit? Including any pap smears or colon screening. no 
 
Reviewed record in preparation for visit and have necessary documentation Pt did not bring medication to office visit for review Opportunity was given for questions Goals that were addressed and/or need to be completed during or after this appointment include There are no preventive care reminders to display for this patient. Body mass index is 36.45 kg/(m^2).

## 2018-10-04 NOTE — MR AVS SNAPSHOT
[address] [address] 
[phone] Patient: Julien Gr MRN: [id number] [place] Visit Information Date & Time Provider Department Dept. Phone Encounter #  
 10/4/2018 10:00 AM David Tolbert  [facility] [phone] [id number] Follow-up Instructions Return in about 3 months (around 2019) for pap smear , routine follow up. Upcoming Health Maintenance Date Due Influenza Age 5 to Adult 2019* PAP AKA CERVICAL CYTOLOGY 3/23/2020 DTaP/Tdap/Td series (2 - Td) 2023 *Topic was postponed. The date shown is not the original due date. Allergies as of 10/4/2018  Review Complete On: 10/4/2018 By: Gloria Mcneil RN No Known Allergies Current Immunizations  Reviewed on 2013 Name Date Tdap 2013 Not reviewed this visit You Were Diagnosed With   
  
 Codes Comments Viral syndrome    -  Primary ICD-10-CM: B34.9 ICD-9-CM: 079.99 Vitals BP Pulse Temp Resp Height(growth percentile) Weight(growth percentile) 110/78 (BP 1 Location: Right arm, BP Patient Position: Sitting) 76 98 °F (36.7 °C) (Oral) 18 4' 9\" (1.448 m) 172 lb (78 kg) LMP SpO2 BMI OB Status Smoking Status 2018 97% 37.22 kg/m2 Having regular periods Never Smoker Vitals History BMI and BSA Data Body Mass Index Body Surface Area  
 37.22 kg/m 2 1.77 m 2 Preferred Pharmacy Pharmacy Name Phone [address] [facility] [phone] Your Updated Medication List  
  
   
This list is accurate as of 10/4/18 10:54 AM.  Always use your most recent med list.  
  
  
  
  
 benzonatate 200 mg capsule Commonly known as:  TESSALON Take 1 Cap by mouth three (3) times daily as needed for Cough for up to 7 days. fluticasone 50 mcg/actuation nasal spray Commonly known as:  Lizbeth Leslie 2 Sprays by Both Nostrils route daily. guaiFENesin  mg ER tablet Commonly known as:  Nilesh & Nilesh Take 1 Tab by mouth two (2) times a day. guaiFENesin-dextromethorphan -30 mg per tablet Commonly known as:  HUMIBID DM Take 1 Tab by mouth two (2) times a day.  
  
 ketoconazole 2 % shampoo Commonly known as:  NIZORAL Apply to scalp daily and let stand for 10-15 min and then rinse  
  
 raNITIdine 150 mg tablet Commonly known as:  ZANTAC Take 1 Tab by mouth two (2) times a day. We Performed the Following BLOOD TYPE, (ABO+RH) [72728 CPT(R)] Follow-up Instructions Return in about 3 months (around 1/4/2019) for pap smear , routine follow up. Patient Instructions Learning About Cervical Cancer Screening What is a cervical cancer screening test? 
 
Cervical cancer screening tests check for cancer of the cervix. The cervix is the lower part of the uterus that opens into the vagina. The test can help your doctor find early changes in the cells that could lead to cancer. Two tests can be used to screen for cervical cancer. They may be used alone or together. A Pap test.  
This test looks for changes in the cells of the cervix. Abnormal cells may be a sign of cancer. A human papillomavirus (HPV) test.  
This test looks for the HPV virus. Some types of HPV can cause cancer. During either test, the doctor or nurse will insert a tool called a speculum into your vagina. The speculum gently spreads apart the vaginal walls. It allows your doctor to see inside the vagina and the cervix. He or she uses a small swab or brush to collect cell samples from your cervix. Try to schedule the test when you're not having your period.  To get ready for the test, avoid douches, tampons, vaginal medicines, sprays, and powders for at least a day before you have the test. 
When should you have a screening test? 
 These guidelines apply to women who are at average risk of cervical cancer. If you don't know your risk, talk with your doctor. Women 21 to 34 
· You can start having a Pap test at age 24. It is done every 3 years. · You can start having the primary HPV test at age 22. It is done every 3 years. Women 30 to 59 · If you had both a Pap test and an HPV test last time and both were normal, you can have a Pap plus an HPV test every 5 years. · If you had only a Pap test last time, as long as your results are normal, you can have a Pap test every 3 years. · If you had only an HPV test last time, as long as your results are normal, you can have an HPV test every 3 years. Women 72 and older · If you are age 72 or older, talk with your doctor about what's right for you. Women ages 72 and older may no longer need to be screened for cervical cancer. When to stop having screening tests depends on your medical history, your overall health, and your risk of cervical cell changes or cervical cancer. What happens after the test? 
The sample of cells taken during your test will be sent to a lab so that an expert can look at the cells. It usually takes a week or two to get the results back. Pap tests · A normal result means that the test didn't find any abnormal cells in the sample. · An abnormal result can mean many things. Most of these aren't cancer. The results of your test may be abnormal because: 
¨ You have an infection of the vagina or cervix, such as a yeast infection. ¨ You have an IUD (intrauterine device for birth control). ¨ You have low estrogen levels after menopause that are causing the cells to change. ¨ You have cell changes that may be a sign of precancer or cancer. The results are ranked based on how serious the changes might be. HPV tests · A normal result means that the test didn't find any high-risk HPV in the sample. · An abnormal HPV test doesn't mean that you have cervical cancer.  It may mean that you are infected with one or more high-risk types of HPV. This increases your chance of having cell changes that may be a sign of precancer or cancer. Follow-up care is a key part of your treatment and safety. Be sure to make and go to all appointments, and call your doctor if you are having problems. It's also a good idea to know your test results and keep a list of the medicines you take. Where can you learn more? Go to http://vicky-walter.info/. Enter P919 in the search box to learn more about \"Learning About Cervical Cancer Screening. \" Current as of: March 28, 2018 Content Version: 11.8 © 9010-2261 [employer]. Care instructions adapted under license by [employer] (which disclaims liability or warranty for this information). If you have questions about a medical condition or this instruction, always ask your healthcare professional. [address] [address] any warranty or liability for your use of this information. Introducing [facility]! [employer] introduces Oco patient portal. Now you can access parts of your medical record, email your doctor's office, and request medication refills online. 1. In your internet browser, go to [email or web address] 2. Click on the First Time User? Click Here link in the Sign In box. You will see the New Member Sign Up page. 3. Enter your Oco Access Code exactly as it appears below. You will not need to use this code after youve completed the sign-up process. If you do not sign up before the expiration date, you must request a new code. · Oco Access Code: [email or web address] Expires: 12/18/2018  3:58 PM 
 
4. Enter the last four digits of your Social Security Number (xxxx) and Date of Birth (mm/dd/yyyy) as indicated and click Submit. You will be taken to the next sign-up page. 5. Create a Oco ID.  This will be your Oco login ID and cannot be changed, so think of one that is secure and easy to remember. 6. Create a [employer] password. You can change your password at any time. 7. Enter your Password Reset Question and Answer. This can be used at a later time if you forget your password. 8. Enter your e-mail address. You will receive e-mail notification when new information is available in [address] 9. Click Sign Up. You can now view and download portions of your medical record. 10. Click the Download Summary menu link to download a portable copy of your medical information. If you have questions, please visit the Frequently Asked Questions section of the [employer] website. Remember, [employer] is NOT to be used for urgent needs. For medical emergencies, dial 911. Now available from your iPhone and Android! Please provide this summary of care documentation to your next provider. Your primary care clinician is listed as Rosette Singleton. If you have any questions after today's visit, please call [phone].

## 2018-10-04 NOTE — PATIENT INSTRUCTIONS
Learning About Cervical Cancer Screening What is a cervical cancer screening test? 
 
Cervical cancer screening tests check for cancer of the cervix. The cervix is the lower part of the uterus that opens into the vagina. The test can help your doctor find early changes in the cells that could lead to cancer. Two tests can be used to screen for cervical cancer. They may be used alone or together. A Pap test.  
This test looks for changes in the cells of the cervix. Abnormal cells may be a sign of cancer. A human papillomavirus (HPV) test.  
This test looks for the HPV virus. Some types of HPV can cause cancer. During either test, the doctor or nurse will insert a tool called a speculum into your vagina. The speculum gently spreads apart the vaginal walls. It allows your doctor to see inside the vagina and the cervix. He or she uses a small swab or brush to collect cell samples from your cervix. Try to schedule the test when you're not having your period. To get ready for the test, avoid douches, tampons, vaginal medicines, sprays, and powders for at least a day before you have the test. 
When should you have a screening test? 
These guidelines apply to women who are at average risk of cervical cancer. If you don't know your risk, talk with your doctor. Women 21 to 34 
· You can start having a Pap test at age 24. It is done every 3 years. · You can start having the primary HPV test at age 22. It is done every 3 years. Women 30 to 59 · If you had both a Pap test and an HPV test last time and both were normal, you can have a Pap plus an HPV test every 5 years. · If you had only a Pap test last time, as long as your results are normal, you can have a Pap test every 3 years. · If you had only an HPV test last time, as long as your results are normal, you can have an HPV test every 3 years. Women 72 and older · If you are age 72 or older, talk with your doctor about what's right for you. Women ages 72 and older may no longer need to be screened for cervical cancer. When to stop having screening tests depends on your medical history, your overall health, and your risk of cervical cell changes or cervical cancer. What happens after the test? 
The sample of cells taken during your test will be sent to a lab so that an expert can look at the cells. It usually takes a week or two to get the results back. Pap tests · A normal result means that the test didn't find any abnormal cells in the sample. · An abnormal result can mean many things. Most of these aren't cancer. The results of your test may be abnormal because: 
¨ You have an infection of the vagina or cervix, such as a yeast infection. ¨ You have an IUD (intrauterine device for birth control). ¨ You have low estrogen levels after menopause that are causing the cells to change. ¨ You have cell changes that may be a sign of precancer or cancer. The results are ranked based on how serious the changes might be. HPV tests · A normal result means that the test didn't find any high-risk HPV in the sample. · An abnormal HPV test doesn't mean that you have cervical cancer. It may mean that you are infected with one or more high-risk types of HPV. This increases your chance of having cell changes that may be a sign of precancer or cancer. Follow-up care is a key part of your treatment and safety. Be sure to make and go to all appointments, and call your doctor if you are having problems. It's also a good idea to know your test results and keep a list of the medicines you take. Where can you learn more? Go to http://vicky-walter.info/. Enter P919 in the search box to learn more about \"Learning About Cervical Cancer Screening. \" Current as of: March 28, 2018 Content Version: 11.8 © 3252-0778 [employer]wise[employer].  Care instructions adapted under license by [employer] (which disclaims liability or warranty for this information). If you have questions about a medical condition or this instruction, always ask your healthcare professional. [address] [address] any warranty or liability for your use of this information.

## 2018-10-04 NOTE — PROGRESS NOTES
I discussed the findings, assessment and plan in detail with the resident and agree with the resident's findings and plan as documented in the resident's note. Elie Curiel M.D.

## 2018-10-04 NOTE — PROGRESS NOTES
[id number] [address] Program, [address] [place] [address] Affiliated with [address] [place] [address] Note Subjective:  
Kala Melendez is a 41 y.o. female presents for follow up evaluation of viral syndrome CC: I feel better History provided by patient and  patient's sister HPI: 
Pt is feeling since being prescribed tessalon perles,mucinex, and flonase. Pt is doing well. Appetite is back to baseline . Overall pt is doing well without complaints. She lives with her sister. They are working on diet and exercise. She is planning to do a diet based on blood type. Pt is requesting to get her blood type today. Current Outpatient Prescriptions on File Prior to Visit Medication Sig Dispense Refill  guaiFENesin ER (MUCINEX) 600 mg ER tablet Take 1 Tab by mouth two (2) times a day. 14 Tab 0  
 raNITIdine (ZANTAC) 150 mg tablet Take 1 Tab by mouth two (2) times a day. 30 Tab 0  
 fluticasone (FLONASE) 50 mcg/actuation nasal spray 2 Sprays by Both Nostrils route daily. 1 Bottle 0  
 guaiFENesin-dextromethorphan SR (HUMIBID DM) 600-30 mg per tablet Take 1 Tab by mouth two (2) times a day. 30 Tab 0  
 ketoconazole (NIZORAL) 2 % shampoo Apply to scalp daily and let stand for 10-15 min and then rinse 1 Bottle 3 No current facility-administered medications on file prior to visit. Past Medical History:  
Diagnosis Date  Down syndrome 1982 Social History Social History  Marital status: SINGLE Spouse name: N/A  
 Number of children: N/A  
 Years of education: N/A Occupational History  Not on file. Social History Main Topics  Smoking status: Never Smoker  Smokeless tobacco: Never Used  Alcohol use No  
 Drug use: No  
 Sexual activity: No  
 
Other Topics Concern  Not on file Social History Narrative Review of Systems Constitutional: Negative for chills and fever. Respiratory: Negative for cough and hemoptysis. Cardiovascular: Negative for chest pain. Gastrointestinal: Negative for abdominal pain, nausea and vomiting. Genitourinary: Negative for dysuria. Musculoskeletal: Negative for myalgias. Skin: Negative for itching and rash. Neurological: Negative for dizziness, tingling, sensory change, focal weakness and headaches. Objective:  
 
Visit Vitals  /78 (BP 1 Location: Right arm, BP Patient Position: Sitting)  Pulse 76  Temp 98 °F (36.7 °C) (Oral)  Resp 18  Ht 4' 9\" (1.448 m)  Wt 172 lb (78 kg)  LMP 09/22/2018  SpO2 97%  BMI 37.22 kg/m2 Physical Exam: 
Physical Exam  
Constitutional: She is oriented to person, place, and time. She appears well-developed and well-nourished. HENT:  
Head: Normocephalic and atraumatic. Eyes: Conjunctivae are normal. Right eye exhibits no discharge. Left eye exhibits no discharge. No scleral icterus. Neck: Normal range of motion. Neck supple. Cardiovascular: Normal rate, regular rhythm, normal heart sounds and intact distal pulses. Exam reveals no gallop and no friction rub. No murmur heard. Pulmonary/Chest: Effort normal and breath sounds normal. No respiratory distress. She has no wheezes. She has no rales. She exhibits no tenderness. Abdominal: Soft. Bowel sounds are normal. There is no tenderness. Musculoskeletal: Normal range of motion. She exhibits no edema, tenderness or deformity. Neurological: She is alert and oriented to person, place, and time. No cranial nerve deficit. Skin: Skin is warm and dry. No rash noted. No erythema. Nursing note and vitals reviewed. Assessment and orders: ICD-10-CM ICD-9-CM 1. Viral syndrome B34.9 079.99   
2. Follow up Z09 V67.9 3. Unspecified blood type, Rh positive Z67.90 V49.89 BLOOD TYPE, (ABO+RH) Diagnoses and all orders for this visit: 1. Viral syndrome 2. Follow up 3. Unspecified blood type, Rh positive -     BLOOD TYPE, (ABO+RH) Follow-up Disposition: 
Return in about 3 months (around 1/4/2019) for pap smear , routine follow up. I have reviewed patient medical and social history and medications. I have reviewed pertinent labs results and other data. I have discussed the diagnosis with the patient and the intended plan as seen in the above orders. The patient has received an after-visit summary and questions were answered concerning future plans. I have discussed medication side effects and warnings with the patient as well. Davin Gonzalez MD 
Resident [facility] 10/09/18

## 2018-10-05 LAB
ABO GROUP BLD: NORMAL
RH BLD: POSITIVE

## 2018-10-09 ENCOUNTER — TELEPHONE (OUTPATIENT)
Dept: FAMILY MEDICINE CLINIC | Age: 38
End: 2018-10-09

## 2018-10-09 NOTE — TELEPHONE ENCOUNTER
----- Message from Sasha Foote sent at 10/9/2018  3:17 PM EDT -----  Regarding: Cammie Hemphill MD / telephone  Pt states that she missed a call and it might have been in reference to a fax that was sent and supposed to be sent to April Charles.  Pt's contact [phone]

## 2019-07-08 ENCOUNTER — OFFICE VISIT (OUTPATIENT)
Dept: FAMILY MEDICINE CLINIC | Age: 39
End: 2019-07-08

## 2019-07-08 VITALS
SYSTOLIC BLOOD PRESSURE: 112 MMHG | OXYGEN SATURATION: 97 % | HEART RATE: 72 BPM | BODY MASS INDEX: 37.76 KG/M2 | RESPIRATION RATE: 18 BRPM | HEIGHT: 57 IN | DIASTOLIC BLOOD PRESSURE: 75 MMHG | WEIGHT: 175 LBS | TEMPERATURE: 97.3 F

## 2019-07-08 DIAGNOSIS — Z02.5 ENCOUNTER FOR EXAMINATION TO PARTICIPATE IN SPECIAL OLYMPICS: ICD-10-CM

## 2019-07-08 DIAGNOSIS — Z02.0 DAY CARE PHYSICAL, ENCOUNTER FOR: ICD-10-CM

## 2019-07-08 DIAGNOSIS — Z02.89 ENCOUNTER FOR COMPLETION OF FORM WITH PATIENT: ICD-10-CM

## 2019-07-08 DIAGNOSIS — Q90.9 DOWN SYNDROME: Primary | ICD-10-CM

## 2019-07-08 RX ORDER — CHOLECALCIFEROL TAB 125 MCG (5000 UNIT) 125 MCG
TAB ORAL DAILY
COMMUNITY

## 2019-07-08 RX ORDER — LORATADINE 10 MG/1
10 TABLET ORAL
COMMUNITY
Start: 2019-04-23 | End: 2020-04-13

## 2019-07-08 NOTE — PROGRESS NOTES
1. Have you been to the ER, urgent care clinic, or been hospitalized since your last visit? No     2. Have you seen or consulted any other health care providers outside of the [facility] since your last visit? No       Reviewed record in preparation for visit and have necessary documentation  opportunity was given for questions  Goals that were addressed and/or need to be completed during or after this appointment include   There are no preventive care reminders to display for this patient.

## 2019-07-08 NOTE — PROGRESS NOTES
Lisa Castillo  41 y.o. female  1980  [facility]  [id number]     [facility]: Progress Note       Encounter Date: 7/8/2019    Chief Complaint   Patient presents with    Complete Physical     day program and special olympics     History of Present Illness   Lisa Castillo is a 41 y.o. female who presents to clinic today for:   Ina Sevilla [phone] cell#    Physical-she will start a day program tomorrow 07/09/2019 and needs a physical form completed. Advised patient's sister that she will need to locate patient's immunization record. This office only has a Td.    TB Screening  1. Have you experienced any of the following symptoms in the past year?  a.) A productive cough for more than 3 weeks? No  b.) Hemoptysis (coughing up blood)? No  c.) Unexplained weight loss? No  d.) Fever, Chills, or night sweats for no known reason? No  e.) Persistent shortness of breath? No  f.) Unexplained fatigue? No  g.) Chest Pain? No  2. Have you had contact with anyone with active tuberculosis disease in the past year? No  3) Do you have a medical condition, or are you taking medications, which suppress   your immune system? No     Also a physical form for the [employer] Olympics need to be completed. She plans to play soccer. LMP 07/03/2019. Health Maintenance    There are no preventive care reminders to display for this patient. Review of Systems   Review of Systems   Constitutional: Negative. HENT: Negative. Eyes: Negative. Respiratory: Negative. Cardiovascular: Negative. Gastrointestinal: Negative. Genitourinary: Negative. Musculoskeletal: Negative. Skin: Negative. Neurological: Negative. Endo/Heme/Allergies: Negative. Psychiatric/Behavioral: Negative.         Vitals/Objective:     Vitals:    07/08/19 0833   BP: 112/75   Pulse: 72   Resp: 18   Temp: 97.3 °F (36.3 °C)   TempSrc: Oral   SpO2: 97%   Weight: 175 lb (79.4 kg)   Height: 4' 9\" (1.448 m)     Body mass index is 37.87 kg/m². Physical Exam   Constitutional: She is oriented to person, place, and time. She is active and cooperative. HENT:   Head: Normocephalic. Right Ear: Hearing, tympanic membrane, external ear and ear canal normal. No decreased hearing is noted. Left Ear: Hearing, tympanic membrane, external ear and ear canal normal. No decreased hearing is noted. Nose: Nose normal.   Mouth/Throat: Uvula is midline, oropharynx is clear and moist and mucous membranes are normal.   Eyes: Pupils are equal, round, and reactive to light. Conjunctivae, EOM and lids are normal.   Neck: Trachea normal, normal range of motion, full passive range of motion without pain and phonation normal. Neck supple. No thyroid mass and no thyromegaly present. Cardiovascular: Normal rate, regular rhythm, normal heart sounds and normal pulses. Pulmonary/Chest: Effort normal and breath sounds normal.   Abdominal: Soft. Normal appearance and bowel sounds are normal. There is no hepatosplenomegaly. There is no tenderness. There is no CVA tenderness. Musculoskeletal: Normal range of motion. Lymphadenopathy:     She has no cervical adenopathy. Neurological: She is alert and oriented to person, place, and time. She has normal strength. No cranial nerve deficit or sensory deficit. She displays a negative Romberg sign. Skin: Skin is warm, dry and intact. Psychiatric: She has a normal mood and affect. Her speech is normal and behavior is normal. Judgment and thought content normal. Cognition and memory are normal.       No results found for this or any previous visit (from the past 24 hour(s)). Assessment and Plan:   1. BMI 37.0-37.9, adult  - CBC W/O DIFF  - METABOLIC PANEL, COMPREHENSIVE  - TSH 3RD GENERATION  - T4, FREE  - LIPID PANEL  - CT/NG/T.VAGINALIS AMPLIFICATION    2.  Down syndrome    - CBC W/O DIFF  - METABOLIC PANEL, COMPREHENSIVE  - TSH 3RD GENERATION  - T4, FREE  - LIPID PANEL  - CT/NG/T.VAGINALIS AMPLIFICATION  - URINALYSIS W/ RFLX MICROSCOPIC    3. Encounter for examination to participate in special [employer]    - CBC W/O DIFF  - METABOLIC PANEL, COMPREHENSIVE  - TSH 3RD GENERATION  - T4, FREE  - LIPID PANEL  - CT/NG/T.VAGINALIS AMPLIFICATION  - URINALYSIS W/ RFLX MICROSCOPIC    Awaiting labs to result per requirement for the day program. Awaiting fax number for day program. Will complete special [employer] physical form and can all copies to CC. I have discussed the diagnosis with the patient and the intended plan as seen in the above orders. she has expressed understanding. The patient has received an after-visit summary and questions were answered concerning future plans. I have discussed medication side effects and warnings with the patient as well. Electronically Signed: Chandra Chino NP     History/Allergies   Patients past medical, surgical and family histories were reviewed and updated. Past Medical History:   Diagnosis Date    Down syndrome 1980    History reviewed. No pertinent surgical history.   Family History   Problem Relation Age of Onset    Hypertension Mother     Hypertension Father     Diabetes Father     GERD Father     Gout Father     Hypertension Sister     Hypertension Maternal Grandmother     Stroke Paternal Grandmother      Social History     Socioeconomic History    Marital status: SINGLE     Spouse name: Not on file    Number of children: Not on file    Years of education: Not on file    Highest education level: Not on file   Occupational History    Not on file   Social Needs    Financial resource strain: Not on file    Food insecurity:     Worry: Not on file     Inability: Not on file    Transportation needs:     Medical: Not on file     Non-medical: Not on file   Tobacco Use    Smoking status: Never Smoker    Smokeless tobacco: Never Used   Substance and Sexual Activity    Alcohol use: No    Drug use: No    Sexual activity: Never   Lifestyle    Physical activity:     Days per week: Not on file     Minutes per session: Not on file    Stress: Not on file   Relationships    Social connections:     Talks on phone: Not on file     Gets together: Not on file     Attends Moravian service: Not on file     Active member of club or organization: Not on file     Attends meetings of clubs or organizations: Not on file     Relationship status: Not on file    Intimate partner violence:     Fear of current or ex partner: Not on file     Emotionally abused: Not on file     Physically abused: Not on file     Forced sexual activity: Not on file   Other Topics Concern    Not on file   Social History Narrative    Not on file         Allergies   Allergen Reactions    Pork/Porcine Containing Products Rash     Pepperoni       Disposition     Follow-up and Dispositions  ·   Return if symptoms worsen or fail to improve. No future appointments. Current Medications after this visit     Current Outpatient Medications   Medication Sig    loratadine (CLARITIN) 10 mg tablet Take 10 mg by mouth daily.  cholecalciferol, VITAMIN D3, (VITAMIN D3) 5,000 unit tab tablet Take  by mouth daily.  fluticasone (FLONASE) 50 mcg/actuation nasal spray 2 Sprays by Both Nostrils route daily.  ketoconazole (NIZORAL) 2 % shampoo Apply to scalp daily and let stand for 10-15 min and then rinse     No current facility-administered medications for this visit.       Medications Discontinued During This Encounter   Medication Reason    guaiFENesin ER (MUCINEX) 600 mg ER tablet Therapy Completed    raNITIdine (ZANTAC) 150 mg tablet Therapy Completed    guaiFENesin-dextromethorphan SR (HUMIBID DM) 600-30 mg per tablet Therapy Completed

## 2019-07-08 NOTE — PATIENT INSTRUCTIONS
A Healthy Lifestyle: Care Instructions  Your Care Instructions    A healthy lifestyle can help you feel good, stay at a healthy weight, and have plenty of energy for both work and play. A healthy lifestyle is something you can share with your whole family. A healthy lifestyle also can lower your risk for serious health problems, such as high blood pressure, heart disease, and diabetes. You can follow a few steps listed below to improve your health and the health of your family. Follow-up care is a key part of your treatment and safety. Be sure to make and go to all appointments, and call your doctor if you are having problems. It's also a good idea to know your test results and keep a list of the medicines you take. How can you care for yourself at home? · Do not eat too much sugar, fat, or fast foods. You can still have dessert and treats now and then. The goal is moderation. · Start small to improve your eating habits. Pay attention to portion sizes, drink less juice and soda pop, and eat more fruits and vegetables. ? Eat a healthy amount of food. A 3-ounce serving of meat, for example, is about the size of a deck of cards. Fill the rest of your plate with vegetables and whole grains. ? Limit the amount of soda and sports drinks you have every day. Drink more water when you are thirsty. ? Eat at least 5 servings of fruits and vegetables every day. It may seem like a lot, but it is not hard to reach this goal. A serving or helping is 1 piece of fruit, 1 cup of vegetables, or 2 cups of leafy, raw vegetables. Have an apple or some carrot sticks as an afternoon snack instead of a candy bar. Try to have fruits and/or vegetables at every meal.  · Make exercise part of your daily routine. You may want to start with simple activities, such as walking, bicycling, or slow swimming. Try to be active 30 to 60 minutes every day. You do not need to do all 30 to 60 minutes all at once.  For example, you can exercise 3 times a day for 10 or 20 minutes. Moderate exercise is safe for most people, but it is always a good idea to talk to your doctor before starting an exercise program.  · Keep moving. Darlin Mcgarryy the lawn, work in the garden, or "[employer]. Take the stairs instead of the elevator at work. · If you smoke, quit. People who smoke have an increased risk for heart attack, stroke, cancer, and other lung illnesses. Quitting is hard, but there are ways to boost your chance of quitting tobacco for good. ? Use nicotine gum, patches, or lozenges. ? Ask your doctor about stop-smoking programs and medicines. ? Keep trying. In addition to reducing your risk of diseases in the future, you will notice some benefits soon after you stop using tobacco. If you have shortness of breath or asthma symptoms, they will likely get better within a few weeks after you quit. · Limit how much alcohol you drink. Moderate amounts of alcohol (up to 2 drinks a day for men, 1 drink a day for women) are okay. But drinking too much can lead to liver problems, high blood pressure, and other health problems. Family health  If you have a family, there are many things you can do together to improve your health. · Eat meals together as a family as often as possible. · Eat healthy foods. This includes fruits, vegetables, lean meats and dairy, and whole grains. · Include your family in your fitness plan. Most people think of activities such as jogging or tennis as the way to fitness, but there are many ways you and your family can be more active. Anything that makes you breathe hard and gets your heart pumping is exercise. Here are some tips:  ? Walk to do errands or to take your child to school or the bus.  ? Go for a family bike ride after dinner instead of watching TV. Where can you learn more? Go to http://vicky-walter.info/. Enter F399 in the search box to learn more about \"A Healthy Lifestyle: Care Instructions. \"  Current as of: September 11, 2018  Content Version: 11.9  © 2741-7064 [employer]. Care instructions adapted under license by [employer] (which disclaims liability or warranty for this information). If you have questions about a medical condition or this instruction, always ask your healthcare professional. [address] any warranty or liability for your use of this information.

## 2019-07-09 LAB
ALBUMIN SERPL-MCNC: 4.3 G/DL (ref 3.5–5.5)
ALBUMIN/GLOB SERPL: 1.2 {RATIO} (ref 1.2–2.2)
ALP SERPL-CCNC: 70 IU/L (ref 39–117)
ALT SERPL-CCNC: 36 IU/L (ref 0–32)
APPEARANCE UR: CLEAR
AST SERPL-CCNC: 24 IU/L (ref 0–40)
BACTERIA #/AREA URNS HPF: ABNORMAL /[HPF]
BILIRUB SERPL-MCNC: 0.3 MG/DL (ref 0–1.2)
BILIRUB UR QL STRIP: NEGATIVE
BUN SERPL-MCNC: 10 MG/DL (ref 6–20)
BUN/CREAT SERPL: 10 (ref 9–23)
C TRACH RRNA SPEC QL NAA+PROBE: NEGATIVE
CALCIUM SERPL-MCNC: 9.8 MG/DL (ref 8.7–10.2)
CASTS URNS QL MICRO: ABNORMAL /LPF
CHLORIDE SERPL-SCNC: 98 MMOL/L (ref 96–106)
CHOLEST SERPL-MCNC: 229 MG/DL (ref 100–199)
CO2 SERPL-SCNC: 26 MMOL/L (ref 20–29)
COLOR UR: YELLOW
CREAT SERPL-MCNC: 1.02 MG/DL (ref 0.57–1)
EPI CELLS #/AREA URNS HPF: ABNORMAL /HPF (ref 0–10)
ERYTHROCYTE [DISTWIDTH] IN BLOOD BY AUTOMATED COUNT: 17.3 % (ref 12.3–15.4)
GLOBULIN SER CALC-MCNC: 3.5 G/DL (ref 1.5–4.5)
GLUCOSE SERPL-MCNC: 97 MG/DL (ref 65–99)
GLUCOSE UR QL: NEGATIVE
HCT VFR BLD AUTO: 39.1 % (ref 34–46.6)
HDLC SERPL-MCNC: 69 MG/DL
HGB BLD-MCNC: 12.8 G/DL (ref 11.1–15.9)
HGB UR QL STRIP: NEGATIVE
KETONES UR QL STRIP: NEGATIVE
LDLC SERPL CALC-MCNC: 106 MG/DL (ref 0–99)
LEUKOCYTE ESTERASE UR QL STRIP: ABNORMAL
MCH RBC QN AUTO: 30.3 PG (ref 26.6–33)
MCHC RBC AUTO-ENTMCNC: 32.7 G/DL (ref 31.5–35.7)
MCV RBC AUTO: 92 FL (ref 79–97)
MICRO URNS: ABNORMAL
MUCOUS THREADS URNS QL MICRO: PRESENT
N GONORRHOEA RRNA SPEC QL NAA+PROBE: NEGATIVE
NITRITE UR QL STRIP: NEGATIVE
PH UR STRIP: 7.5 [PH] (ref 5–7.5)
PLATELET # BLD AUTO: 360 X10E3/UL (ref 150–450)
POTASSIUM SERPL-SCNC: 5.2 MMOL/L (ref 3.5–5.2)
PROT SERPL-MCNC: 7.8 G/DL (ref 6–8.5)
PROT UR QL STRIP: NEGATIVE
RBC # BLD AUTO: 4.23 X10E6/UL (ref 3.77–5.28)
RBC #/AREA URNS HPF: ABNORMAL /HPF (ref 0–2)
SODIUM SERPL-SCNC: 140 MMOL/L (ref 134–144)
SP GR UR: 1.02 (ref 1–1.03)
T VAGINALIS RRNA VAG QL NAA+PROBE: NEGATIVE
T4 FREE SERPL-MCNC: 1 NG/DL (ref 0.82–1.77)
TRIGL SERPL-MCNC: 268 MG/DL (ref 0–149)
TSH SERPL DL<=0.005 MIU/L-ACNC: 7.71 UIU/ML (ref 0.45–4.5)
UROBILINOGEN UR STRIP-MCNC: 0.2 MG/DL (ref 0.2–1)
VLDLC SERPL CALC-MCNC: 54 MG/DL (ref 5–40)
WBC # BLD AUTO: 8.7 X10E3/UL (ref 3.4–10.8)
WBC #/AREA URNS HPF: ABNORMAL /HPF (ref 0–5)

## 2019-09-25 ENCOUNTER — OFFICE VISIT (OUTPATIENT)
Dept: FAMILY MEDICINE CLINIC | Age: 39
End: 2019-09-25

## 2019-09-25 VITALS
SYSTOLIC BLOOD PRESSURE: 99 MMHG | OXYGEN SATURATION: 98 % | DIASTOLIC BLOOD PRESSURE: 65 MMHG | WEIGHT: 171.4 LBS | TEMPERATURE: 97.4 F | HEART RATE: 68 BPM | BODY MASS INDEX: 36.98 KG/M2 | HEIGHT: 57 IN | RESPIRATION RATE: 18 BRPM

## 2019-09-25 DIAGNOSIS — L40.9 PSORIASIS OF SCALP: ICD-10-CM

## 2019-09-25 DIAGNOSIS — L21.0 DANDRUFF: Primary | ICD-10-CM

## 2019-09-25 NOTE — PROGRESS NOTES
Chief Complaint   Patient presents with    Hair/Scalp Problem     hair has thinned out and itches     Visit Vitals  BP 99/65 (BP 1 Location: Right arm, BP Patient Position: Sitting)   Pulse 68   Temp 97.4 °F (36.3 °C) (Oral)   Resp 18   Ht 4' 9\" (1.448 m)   Wt 171 lb 6.4 oz (77.7 kg)   LMP 08/29/2019   SpO2 98%   BMI 37.09 kg/m²     1. Have you been to the ER, urgent care clinic since your last visit? Hospitalized since your last visit? No    2. Have you seen or consulted any other health care providers outside of the [address] [place] [address] since your last visit? Include any pap smears or colon screening.  No    Reviewed record in preparation for visit and have necessary documentation  Pt did not bring medication to office visit for review  opportunity was given for questions  Goals that were addressed and/or need to be completed during or after this appointment include   Health Maintenance Due   Topic Date Due    Influenza Age 5 to Adult  08/01/2019

## 2019-09-25 NOTE — PROGRESS NOTES
Hector Campbell  42 y.o. female  1980  [facility]  [id number]     [facility] Practice: Progress Note       Encounter Date: 9/25/2019    Chief Complaint   Patient presents with    Hair/Scalp Problem     hair has thinned out and itches     History of Present Illness   Hector Campbell is a 42 y.o. female who presents to clinic today with her sister for: patient with down syndrome. Hair/scalp problem- states previous PCP dx her with seborrheic dermatitis and ordered ketoconazole shampoo. Sister states it worked for a while but now the itching, dandruff and scalp redness has returned. In the past has tried tea tree oil, vinegar, selsum blue and head and shoulders. States once the dead skin and dandruff goes away; red spots appear. Reports that it does itch. Compliant with the claritin. Health Maintenance    Health Maintenance Due   Topic Date Due    Influenza Age 5 to Adult  08/01/2019     Review of Systems   Review of Systems   Constitutional: Negative. HENT: Negative. Eyes: Negative. Respiratory: Negative. Cardiovascular: Negative. Gastrointestinal: Negative. Genitourinary: Negative. Musculoskeletal: Negative. Skin: Positive for itching and rash. Neurological: Negative. Endo/Heme/Allergies: Negative. Psychiatric/Behavioral: Negative. Vitals/Objective:     Vitals:    09/25/19 1056   BP: 99/65   Pulse: 68   Resp: 18   Temp: 97.4 °F (36.3 °C)   TempSrc: Oral   SpO2: 98%   Weight: 171 lb 6.4 oz (77.7 kg)   Height: 4' 9\" (1.448 m)     Body mass index is 37.09 kg/m². Physical Exam   HENT:   Head:       Skin: Skin is warm, dry and intact. No results found for this or any previous visit (from the past 24 hour(s)). Assessment and Plan:   1. Dandruff      2.  Psoriasis of scalp      Discussed trying the coal tar salicylic acid shampoo for the next 2 weeks; if not better sister would like a referral to dermatology preferably in [place] area.    I have discussed the diagnosis with the patient and the intended plan as seen in the above orders. she has expressed understanding. The patient has received an after-visit summary and questions were answered concerning future plans. I have discussed medication side effects and warnings with the patient as well. Electronically Signed: Helena Cueto NP     History/Allergies   Patients past medical, surgical and family histories were reviewed and updated. Past Medical History:   Diagnosis Date    Down syndrome 1982    History reviewed. No pertinent surgical history.   Family History   Problem Relation Age of Onset    Hypertension Mother     Hypertension Father     Diabetes Father     GERD Father     Gout Father     Hypertension Sister     Hypertension Maternal Grandmother     Stroke Paternal Grandmother      Social History     Socioeconomic History    Marital status: SINGLE     Spouse name: Not on file    Number of children: Not on file    Years of education: Not on file    Highest education level: Not on file   Occupational History    Not on file   Social Needs    Financial resource strain: Not on file    Food insecurity:     Worry: Not on file     Inability: Not on file    Transportation needs:     Medical: Not on file     Non-medical: Not on file   Tobacco Use    Smoking status: Never Smoker    Smokeless tobacco: Never Used   Substance and Sexual Activity    Alcohol use: No    Drug use: No    Sexual activity: Never   Lifestyle    Physical activity:     Days per week: Not on file     Minutes per session: Not on file    Stress: Not on file   Relationships    Social connections:     Talks on phone: Not on file     Gets together: Not on file     Attends Jain service: Not on file     Active member of club or organization: Not on file     Attends meetings of clubs or organizations: Not on file     Relationship status: Not on file    Intimate partner violence: Fear of current or ex partner: Not on file     Emotionally abused: Not on file     Physically abused: Not on file     Forced sexual activity: Not on file   Other Topics Concern    Not on file   Social History Narrative    Not on file         Allergies   Allergen Reactions    Pork/Porcine Containing Products Rash     Pepperoni       Disposition     Follow-up and Dispositions  ·   Return if symptoms worsen or fail to improve. No future appointments. Current Medications after this visit     Current Outpatient Medications   Medication Sig    coal tar-salicylic acid 76-0 % sham Rub shampoo on wet scalp, rinse thoroughly; repeat; leave on 5 minutes; rinse thoroughly; apply twice weekly for the first 2 weeks then once weekly or more often if needed. Indications: dandruff, psoriasis    loratadine (CLARITIN) 10 mg tablet Take 10 mg by mouth daily as needed.  cholecalciferol, VITAMIN D3, (VITAMIN D3) 5,000 unit tab tablet Take  by mouth daily.  fluticasone (FLONASE) 50 mcg/actuation nasal spray 2 Sprays by Both Nostrils route daily. (Patient taking differently: 2 Sprays by Both Nostrils route as needed.)    ketoconazole (NIZORAL) 2 % shampoo Apply to scalp daily and let stand for 10-15 min and then rinse     No current facility-administered medications for this visit. There are no discontinued medications.

## 2019-09-25 NOTE — PATIENT INSTRUCTIONS
Psoriasis: Care Instructions  Your Care Instructions  Psoriasis (say \"dng-WV-ym-luisa\") is a long-term skin problem that causes thick, white, silvery, or red patches on the skin. The patches may be small or large, and they occur most often on the knees, elbows, scalp, hands, feet, or lower back. The skin may be scaly. If the condition is severe, your skin can become itchy and tender. Psoriasis also can be embarrassing if the patches are on visible areas. You can treat psoriasis with good care at home and with medicine from your doctor. You may put medicine on your skin and take pills or have shots to stop the redness and swelling. Your doctor also may suggest ultraviolet light treatments. Follow-up care is a key part of your treatment and safety. Be sure to make and go to all appointments, and call your doctor if you are having problems. It's also a good idea to know your test results and keep a list of the medicines you take. How can you care for yourself at home? · If your doctor prescribes medicine, use it exactly as prescribed. Follow your doctor's advice for sunlight or ultraviolet light treatment. Call your doctor if you think you are having a problem with your medicine. · Protect your skin:  ? Keep your skin moist. After bathing, put an ointment, cream, or lotion on your skin while it is still damp. This seals in moisture. Use over-the-counter products that your doctor suggests. These may include Cetaphil, Lubriderm, or Eucerin. Petroleum jelly (such as Vaseline) and vegetable shortening (such as Crisco) also work. ? If you have psoriasis on your scalp, use a shampoo with salicylic acid, such as Neutrogena T/Sal.  ? Avoid harsh skin products, such as those that contain alcohol. ? Cover your skin in cold weather. ? Try to prevent sunburn. Although short periods of sun exposure reduce psoriasis in most people, too much sun can damage the skin and cause skin cancer.  In addition, sunburns can trigger psoriasis. Use sunscreen on areas of your skin that do not have psoriasis. Make sure to use a broad-spectrum sunscreen that has a sun protection factor (SPF) of 30 or higher. Use it every day, even when it is cloudy. ? Take care to avoid accidents such as cutting or scraping your skin. An injury to the skin can cause psoriasis patches to form anywhere on the body, including the area of the injury. ? Avoid tight shoes, clothing, watchbands, and hats. These may irritate your skin. ? Use a vaporizer or humidifier to add moisture to your bedroom. Follow the directions for cleaning the machine. · Try making one or more changes to your daily habits to help with managing your psoriasis. For example:  ? Try to control stress and anxiety. They may cause psoriasis to appear suddenly or can make symptoms worse. ? If you smoke, think about quitting. If you need help quitting, talk to your doctor about stop-smoking programs and medicines. ? If you drink, limit or reduce the amount of alcohol you drink. ? If you are overweight, see if you can lose some weight. · Seek support from family and friends. Talk to a counselor or other professional if you feel sad about your condition and need more help. When should you call for help? Call your doctor now or seek immediate medical care if:    · You have signs of infection, such as:  ? Increased pain, swelling, warmth, or redness. ? Red streaks leading from the area. ? Pus draining from the area. ? A fever.    Watch closely for changes in your health, and be sure to contact your doctor if:    · You have swelling, stiffness, or pain in your joints.     · You do not get better as expected. Where can you learn more? Go to http://vicky-walter.info/. Enter T869 in the search box to learn more about \"Psoriasis: Care Instructions. \"  Current as of: April 1, 2019  Content Version: 12.2  © 4876-2820 [employer].  Care instructions adapted under license by [facility] [address] (which disclaims liability or warranty for this information). If you have questions about a medical condition or this instruction, always ask your healthcare professional. [address] any warranty or liability for your use of this information.  (On the skin)    (jerardo tar)  Relieves drying, itching, flaking, scaling, or irritation of the skin caused by psoriasis or seborrheic dermatitis. Brand Name(s): DHS Tar, DHS Tar Gel Shampoo, DHS Tar Shampoo, Ionil-T,  Psoriasis Medicated Conditioning, Neutrogena T/Gel, Scytera, Duglas Gel, Therapeutic Shampoo, X-Gerson T Carolynn, X-Gerson T Plus Conditioning Shampoo   There may be other brand names for this medicine. When This Medicine Should Not Be Used: This medicine is not right for everyone. Do not use it if you had an allergic reaction to coal tar. How to Use This Medicine:   Cream, Liquid, Shampoo, Foam, Soap  · Your doctor will tell you how much medicine to use. Do not use more than directed. · Follow the instructions on the medicine label if you are using this medicine without a prescription. · Use this medicine only on your skin. Rinse it off right away if it gets on a cut or scrape. Do not get the medicine in your eyes, nose, or mouth. Do not use this medicine in or around your rectum or in the genital or groin area. · This medicine is not for long-term use. · You may pour 1 to 3 capfuls of Ngoc® Tar oil into warm bath water. You may apply Ngoc® Tar oil directly onto wet skin if used after you take a shower. · To use Ngoc® Tar shampoo, wet your hair and scalp thoroughly with water. Work the [employer] into a lather and gently massage it over your whole scalp for 3 to 5 minutes before you rinse. Repeat this process one time and rinse thoroughly. · Missed dose: Apply a dose as soon as you can. If it is almost time for your next dose, wait until then and apply a regular dose.  Do not apply extra medicine to make up for a missed dose. · Store the medicine in a closed container at room temperature, away from heat, moisture, and direct light. Drugs and Foods to Avoid:   Ask your doctor or pharmacist before using any other medicine, including over-the-counter medicines, vitamins, and herbal products. · Do not put cosmetics or skin care products on the treated skin. Warnings While Using This Medicine:   · Tell your doctor if you are pregnant or breastfeeding, or if you have a skin problem that involves a large area of your body. Tell your doctor if you are using other medicines or treatment for psoriasis, such as ultraviolet (UV) radiation. · Do not use this medicine to treat a skin problem your doctor has not examined. · This medicine may make your skin more sensitive to sunlight. Wear sunscreen. Do not use sunlamps or tanning beds. · Call your doctor if your symptoms do not improve or if they get worse. · Keep all medicine out of the reach of children. Never share your medicine with anyone. Possible Side Effects While Using This Medicine:   Call your doctor right away if you notice any of these side effects:  · Allergic reaction: Itching or hives, swelling in your face or hands, swelling or tingling in your mouth or throat, chest tightness, trouble breathing  If you notice these less serious side effects, talk with your doctor:   · Burning, itching, swelling, or redness in the treated skin area  If you notice other side effects that you think are caused by this medicine, tell your doctor. Call your doctor for medical advice about side effects. You may report side effects to FDA at 5-954-FDA-2037  © 2017 [facility] Information is for End User's use only and may not be sold, redistributed or otherwise used for commercial purposes. The above information is an  only. It is not intended as medical advice for individual conditions or treatments.  Talk to your doctor, nurse or pharmacist before following any medical regimen to see if it is safe and effective for you.

## 2019-09-30 ENCOUNTER — TELEPHONE (OUTPATIENT)
Dept: FAMILY MEDICINE CLINIC | Age: 39
End: 2019-09-30

## 2019-09-30 NOTE — TELEPHONE ENCOUNTER
Returned sister call,  Sister unavailable, left message in regards to rx has been faxed to [facility].

## 2019-09-30 NOTE — TELEPHONE ENCOUNTER
Caller's first/last name:  sister More, on HIPAA    Reason for call:  Medication problem    Does caller want a return call? Yes    Best contact number:   [phone]    Further clarification of call:        Kevin Barone, , called. [facility] did not have the prescription given for coal tar-salicylic acid 64-0 % sham. Can it be sent to the [facility] in [place]?   Please advise [phone]

## 2019-10-01 ENCOUNTER — TELEPHONE (OUTPATIENT)
Dept: FAMILY MEDICINE CLINIC | Age: 39
End: 2019-10-01

## 2019-10-01 NOTE — TELEPHONE ENCOUNTER
Spoke with Ruchi Aldridge and informed of Georgie Shirley is preparing rx for shampoo. Carmen verbalized understanding and appreciative.

## 2019-10-01 NOTE — TELEPHONE ENCOUNTER
Telephone Encounter        Caller's first/last name:  [address]      Relationship to patient and are they on HIPAA:        Reason for call:  Medication      Further clarification of call:  Pharmacist called to advise that they do not have a shampoo solution like the one prescribed. Pharmacist is requesting nurse or provider call pharmacy tomorrow to discuss an alternative. Does caller want a return call? Yes      Best contact number:  [phone]    Did you check for a duplicate Encounter?

## 2019-10-07 DIAGNOSIS — L21.0 DANDRUFF: ICD-10-CM

## 2019-10-07 DIAGNOSIS — L40.9 PSORIASIS OF SCALP: Primary | ICD-10-CM

## 2019-10-07 DIAGNOSIS — L21.9 SEBORRHEIC DERMATITIS: ICD-10-CM

## 2019-10-07 NOTE — TELEPHONE ENCOUNTER
[address] in [place] faxed over a notification stating they cannot get this medication either. Pt's sister is asking for a referral for the dermatologist near her home. KYRIE Delcid Chi is aware of this being the next step she suggested. Thanks Please call the Sister.  Thanks

## 2019-10-07 NOTE — TELEPHONE ENCOUNTER
Returned [employer] call and informed of referral sent for dermatologist.  [employer] verbalized understanding and appreciative.

## 2019-10-18 ENCOUNTER — TELEPHONE (OUTPATIENT)
Dept: FAMILY MEDICINE CLINIC | Age: 39
End: 2019-10-18

## 2019-12-05 NOTE — TELEPHONE ENCOUNTER
PT WAS TRANSFERRED FROM [facility] TO [facility] AND PT STARTED SEIZING. MD FRAZIER. Pt requesting another dermatologist location that would be closer to her home. She is okay w/ it being outside of [facility]. Left arm;

## 2020-04-13 RX ORDER — LORATADINE 10 MG/1
TABLET ORAL
Qty: 30 TAB | Refills: 0 | Status: SHIPPED | OUTPATIENT
Start: 2020-04-13 | End: 2021-04-21 | Stop reason: SDUPTHER

## 2020-12-29 ENCOUNTER — VIRTUAL VISIT (OUTPATIENT)
Dept: FAMILY MEDICINE CLINIC | Age: 40
End: 2020-12-29
Payer: MEDICAID

## 2020-12-29 DIAGNOSIS — R52 BODY ACHES: ICD-10-CM

## 2020-12-29 DIAGNOSIS — N91.2 AMENORRHEA: Primary | ICD-10-CM

## 2020-12-29 PROCEDURE — 99213 OFFICE O/P EST LOW 20 MIN: CPT | Performed by: STUDENT IN AN ORGANIZED HEALTH CARE EDUCATION/TRAINING PROGRAM

## 2020-12-29 NOTE — PROGRESS NOTES
Abida Peralta  47 y.o. female  1980  [facility]  [address] [facility]  [id number]    [phone] (home)      [address]:    Telephone Encounter  NICK [address]       Encounter Date: 12/29/2020    Consent:  She and/or the health care decision maker is aware that that she may receive a bill for this telephone service, depending on her insurance coverage, and has provided verbal consent to proceed: Yes    No chief complaint on file. History of Present Illness   Abida Peralta is a 47 y.o. female was evaluated by telephone. I communicated with the patient's sister (health care decision maker) about amenorrhea. Patient has Hx of down syndrome. Lives with sister who is care taker. Cycles have been shortening since July, last cycle was in September. Complains of body aches, and some jaw pain within this year. Energy levels are normal, no night sweats or fevers, no cough or sob. Never had a pap smear. Review of Systems   Review of Systems   Constitutional: Negative for chills, fever, malaise/fatigue and weight loss. HENT: Negative for congestion and sinus pain. Respiratory: Negative for cough and shortness of breath. Cardiovascular: Negative for chest pain and leg swelling. Musculoskeletal: Positive for myalgias. Neurological: Negative for headaches. Vitals/Objective:      Unable to assess over telephone, spoke to sister                   Due to this being a TeleHealth evaluation, many elements of the physical examination are unable to be assessed. Assessment and Plan:   Time-based coding, delete if not needed: I spent at least 15 minutes with this established patient, and >50% of the time was spent counseling and/or coordinating care regarding amenorrhea  Total Time: minutes: 11-20 minutes    1. Amenorrhea: pregnancy vs. Early ovarian failure in patient with down syndrome vs PCOS. According to patient's sister she has never been sexually active. - [address] AND LH; Future  - TSH 3RD GENERATION; Future  - BETA HCG, QT; Future    2. Body aches: and jaw pain starting this year. No B-like symptoms. Will obtain basic lab work and evaluate in clinic in a month if persistent.   - CBC WITH AUTOMATED DIFF; Future  - METABOLIC PANEL, COMPREHENSIVE; Future  - VITAMIN D, 25 HYDROXY; Future      I affirm this is a Patient Initiated Episode with an Established Patient who has not had a related appointment within my department in the past 7 days or scheduled within the next 24 hours. Note: not billable if this call serves to triage the patient into an appointment for the relevant concern     We discussed the expected course, resolution and complications of the diagnosis(es) in detail. Medication risks, benefits, costs, interactions, and alternatives were discussed as indicated. I advised her to contact the office if her condition worsens, changes or fails to improve as anticipated. She expressed understanding with the diagnosis(es) and plan. Patient understands that this encounter was a temporary measure, and the importance of further follow up and examination was emphasized. Patient verbalized understanding. Electronically Signed: Sushant Miguel DO    Providers location when delivering service: clinic    CPT:  49717 (5-10 minutes)  [phone] (11-20 minutes)  21  (21-30 minutes)      ICD-10-CM ICD-9-CM    1. Amenorrhea  N91.2 626.0 FSH AND LH      TSH 3RD GENERATION      BETA HCG, QT   2.  Body aches  R52 780.96 CBC WITH AUTOMATED DIFF      METABOLIC PANEL, COMPREHENSIVE      VITAMIN D, 25 HYDROXY       Pursuant to the emergency declaration under the [facility] waiver authority and the [employer] Act, this Virtual  Visit was conducted, with patient's consent, to reduce the patient's risk of exposure to COVID-19 and provide continuity of care for an established patient. Services were provided through a video synchronous discussion virtually to substitute for in-person clinic visit. History   Patients past medical, surgical and family histories were personally reviewed and updated. Past Medical History:   Diagnosis Date    Down syndrome 1980     No past surgical history on file.   Family History   Problem Relation Age of Onset    Hypertension Mother     Hypertension Father     Diabetes Father     GERD Father     Gout Father     Hypertension Sister     Hypertension Maternal Grandmother     Stroke Paternal Grandmother      Social History     Socioeconomic History    Marital status: SINGLE     Spouse name: Not on file    Number of children: Not on file    Years of education: Not on file    Highest education level: Not on file   Occupational History    Not on file   Social Needs    Financial resource strain: Not on file    Food insecurity     Worry: Not on file     Inability: Not on file    Transportation needs     Medical: Not on file     Non-medical: Not on file   Tobacco Use    Smoking status: Never Smoker    Smokeless tobacco: Never Used   Substance and Sexual Activity    Alcohol use: No    Drug use: No    Sexual activity: Never   Lifestyle    Physical activity     Days per week: Not on file     Minutes per session: Not on file    Stress: Not on file   Relationships    Social connections     Talks on phone: Not on file     Gets together: Not on file     Attends Baptism service: Not on file     Active member of club or organization: Not on file     Attends meetings of clubs or organizations: Not on file     Relationship status: Not on file    Intimate partner violence     Fear of current or ex partner: Not on file     Emotionally abused: Not on file     Physically abused: Not on file     Forced sexual activity: Not on file   Other Topics Concern    Not on file   Social History Narrative    Not on file            Current Medications/Allergies   Medications and Allergies reviewed:    Current Outpatient Medications   Medication Sig Dispense Refill    Allergy Relief, loratadine, 10 mg tablet Take 1 tablet by mouth once daily 30 Tab 0    coal tar-salicylic acid 76-5 % sham Rub shampoo on wet scalp, rinse thoroughly; repeat; leave on 5 minutes; rinse thoroughly; apply twice weekly for the first 2 weeks then once weekly or more often if needed. Indications: dandruff, psoriasis 1 Bottle 1    cholecalciferol, VITAMIN D3, (VITAMIN D3) 5,000 unit tab tablet Take  by mouth daily.  fluticasone (FLONASE) 50 mcg/actuation nasal spray 2 Sprays by Both Nostrils route daily.  (Patient taking differently: 2 Sprays by Both Nostrils route as needed.) 1 Bottle 0    ketoconazole (NIZORAL) 2 % shampoo Apply to scalp daily and let stand for 10-15 min and then rinse 1 Bottle 3     Allergies   Allergen Reactions    Pork/Porcine Containing Products Rash     [place]

## 2021-01-05 DIAGNOSIS — N91.2 AMENORRHEA: Primary | ICD-10-CM

## 2021-01-06 ENCOUNTER — LAB ONLY (OUTPATIENT)
Dept: FAMILY MEDICINE CLINIC | Age: 41
End: 2021-01-06

## 2021-01-06 DIAGNOSIS — N91.2 AMENORRHEA: ICD-10-CM

## 2021-01-08 NOTE — PROGRESS NOTES
TSH elevated, T4 normal consistent with subclinical hypothyroidism. Estradiol, prolactin within normal limits. If testosterone comes back normal, will consider treatment for subclinical hypothyroidism for amenorrhea.

## 2021-01-09 LAB
ESTRADIOL SERPL-MCNC: 18.3 PG/ML
PROLACTIN SERPL-MCNC: 12.9 NG/ML (ref 4.8–23.3)
T4 FREE SERPL-MCNC: 0.92 NG/DL (ref 0.82–1.77)
TESTOST FREE SERPL-MCNC: 7.5 PG/ML (ref 0–4.2)
TSH SERPL DL<=0.005 MIU/L-ACNC: 5.36 UIU/ML (ref 0.45–4.5)

## 2021-01-11 ENCOUNTER — TELEPHONE (OUTPATIENT)
Dept: FAMILY MEDICINE CLINIC | Age: 41
End: 2021-01-11

## 2021-01-11 NOTE — TELEPHONE ENCOUNTER
Attempted to call. unsuccessful. line has restrictions  Mother number not inservice  Will send letter

## 2021-01-11 NOTE — TELEPHONE ENCOUNTER
----- Message from Leonard Rya DO sent at 1/11/2021  8:09 AM EST -----  Ji Aiken can you get this patient a VV with me, preferably on Friday? Thanks!

## 2021-01-11 NOTE — LETTER
1/11/2021 Ms. Belén Smith 
[facility] [address] [address] Dear MsChet Sarah, Your good health is important to us, that is why we are sending you this friendly reminder. As always, our goal is to be your partner in life-long wellness. We have been unable to reach you by phone to notify you of your test results. Please call [facility] at (i) [phone] to schedule this virtual appointment at your earliest convenience Sincerely, TITO ESPINOSA [facility] KIT [facility]OOK [facility]

## 2021-01-15 ENCOUNTER — TELEPHONE (OUTPATIENT)
Dept: FAMILY MEDICINE CLINIC | Age: 41
End: 2021-01-15

## 2021-01-15 ENCOUNTER — VIRTUAL VISIT (OUTPATIENT)
Dept: FAMILY MEDICINE CLINIC | Age: 41
End: 2021-01-15
Payer: MEDICAID

## 2021-01-15 DIAGNOSIS — E28.2 PCOS (POLYCYSTIC OVARIAN SYNDROME): Primary | ICD-10-CM

## 2021-01-15 PROCEDURE — 99443 PR PHYS/QHP TELEPHONE EVALUATION 21-30 MIN: CPT | Performed by: STUDENT IN AN ORGANIZED HEALTH CARE EDUCATION/TRAINING PROGRAM

## 2021-01-15 RX ORDER — ACETAMINOPHEN AND CODEINE PHOSPHATE 120; 12 MG/5ML; MG/5ML
1 SOLUTION ORAL DAILY
Qty: 1 PACKAGE | Refills: 3 | Status: SHIPPED | OUTPATIENT
Start: 2021-01-15 | End: 2021-01-27 | Stop reason: SDUPTHER

## 2021-01-15 NOTE — PROGRESS NOTES
Jolene Giordano  47 y.o. female  1980  [facility]  [address]  [id number]    [phone] (home)      [address]:    Telephone Encounter  Sherin ROMERO       Encounter Date: 1/15/2021    Consent:  She and/or the health care decision maker is aware that that she may receive a bill for this telephone service, depending on her insurance coverage, and has provided verbal consent to proceed: Yes    No chief complaint on file. History of Present Illness   Alin Ventura is a 47 y.o. female was evaluated by telephone. Spoke to patient's sister, Amy Zapata, (health care decision maker)    Amenorrhea: Still has not had a period. No other complaints today    Diet: not a lot of fried foods, salmon, salad, lentils, sprite      Review of Systems   Review of Systems   Constitutional: Negative for chills, fever, malaise/fatigue and weight loss. HENT: Negative for congestion, hearing loss, sinus pain and sore throat. Eyes: Negative for blurred vision, double vision and pain. Respiratory: Negative for cough, sputum production, shortness of breath and wheezing. Cardiovascular: Negative for chest pain, palpitations and leg swelling. Gastrointestinal: Negative for abdominal pain, heartburn, nausea and vomiting. Genitourinary: Negative for dysuria and urgency. Musculoskeletal: Negative for back pain, myalgias and neck pain. Skin: Negative for rash. Neurological: Negative for dizziness, weakness and headaches. Endo/Heme/Allergies: Does not bruise/bleed easily. Psychiatric/Behavioral: Negative for depression and suicidal ideas. Vitals/Objective:     General: alert, cooperative, no distress                   Due to this being a TeleHealth evaluation, many elements of the physical examination are unable to be assessed.       Assessment and Plan:   Time-based coding, delete if not needed: I spent at least 25 minutes with this established patient, and >50% of the time was spent counseling and/or coordinating care regarding PCOS   Total Time: minutes: 21-30 minutes    1. PCOS (polycystic ovarian syndrome): Diagnosis made with 2/3 of [place] Criteria (anovulation and elevated testosterone). Spoke to sister in depth about diagnosis. Discussed COCP as first line treatment, but with patient's BMI> 27 and age, she as at risk for VTE. Will obtain baseline metabolic lab work and start on progesterone mini pill for endometrial protection. Discussed diet and weight loss as well. - HEMOGLOBIN A1C WITH EAG; Future  - LIPID PANEL; Future  - norethindrone (MICRONOR) 0.35 mg tab; Take 1 Tab by mouth daily. Dispense: 1 Package; Refill: 3      I affirm this is a Patient Initiated Episode with an Established Patient who has not had a related appointment within my department in the past 7 days or scheduled within the next 24 hours. Note: not billable if this call serves to triage the patient into an appointment for the relevant concern     We discussed the expected course, resolution and complications of the diagnosis(es) in detail. Medication risks, benefits, costs, interactions, and alternatives were discussed as indicated. I advised her to contact the office if her condition worsens, changes or fails to improve as anticipated. She expressed understanding with the diagnosis(es) and plan. Patient understands that this encounter was a temporary measure, and the importance of further follow up and examination was emphasized. Patient verbalized understanding. Electronically Signed: Chet Bonds DO    Providers location when delivering service: home    CPT:  90598 (5-10 minutes)  37555 (11-20 minutes)  21  (21-30 minutes)      ICD-10-CM ICD-9-CM    1.  PCOS (polycystic ovarian syndrome)  E28.2 256.4 HEMOGLOBIN A1C WITH EAG      LIPID PANEL      norethindrone (MICRONOR) 0.35 mg tab       Pursuant to the emergency declaration under the [address] Emergencies Act, 1135 waiver authority and the Coronavirus Preparedness and Response Supplemental Appropriations Act, this Virtual  Visit was conducted, with patient's consent, to reduce the patient's risk of exposure to COVID-19 and provide continuity of care for an established patient. Services were provided through a video synchronous discussion virtually to substitute for in-person clinic visit. History   Patients past medical, surgical and family histories were personally reviewed and updated. Past Medical History:   Diagnosis Date    Down syndrome 1980     No past surgical history on file.   Family History   Problem Relation Age of Onset    Hypertension Mother     Hypertension Father     Diabetes Father     GERD Father     Gout Father     Hypertension Sister     Hypertension Maternal Grandmother     Stroke Paternal Grandmother      Social History     Socioeconomic History    Marital status: SINGLE     Spouse name: Not on file    Number of children: Not on file    Years of education: Not on file    Highest education level: Not on file   Occupational History    Not on file   Social Needs    Financial resource strain: Not on file    Food insecurity     Worry: Not on file     Inability: Not on file    Transportation needs     Medical: Not on file     Non-medical: Not on file   Tobacco Use    Smoking status: Never Smoker    Smokeless tobacco: Never Used   Substance and Sexual Activity    Alcohol use: No    Drug use: No    Sexual activity: Never   Lifestyle    Physical activity     Days per week: Not on file     Minutes per session: Not on file    Stress: Not on file   Relationships    Social connections     Talks on phone: Not on file     Gets together: Not on file     Attends Worship service: Not on file     Active member of club or organization: Not on file     Attends meetings of clubs or organizations: Not on file     Relationship status: Not on file    Intimate partner violence     Fear of current or ex partner: Not on file     Emotionally abused: Not on file     Physically abused: Not on file     Forced sexual activity: Not on file   Other Topics Concern    Not on file   Social History Narrative    Not on file            Current Medications/Allergies   Medications and Allergies reviewed:    Current Outpatient Medications   Medication Sig Dispense Refill    norethindrone (MICRONOR) 0.35 mg tab Take 1 Tab by mouth daily. 1 Package 3    Allergy Relief, loratadine, 10 mg tablet Take 1 tablet by mouth once daily 30 Tab 0    coal tar-salicylic acid 83-8 % sham Rub shampoo on wet scalp, rinse thoroughly; repeat; leave on 5 minutes; rinse thoroughly; apply twice weekly for the first 2 weeks then once weekly or more often if needed. Indications: dandruff, psoriasis 1 Bottle 1    cholecalciferol, VITAMIN D3, (VITAMIN D3) 5,000 unit tab tablet Take  by mouth daily.  fluticasone (FLONASE) 50 mcg/actuation nasal spray 2 Sprays by Both Nostrils route daily.  (Patient taking differently: 2 Sprays by Both Nostrils route as needed.) 1 Bottle 0    ketoconazole (NIZORAL) 2 % shampoo Apply to scalp daily and let stand for 10-15 min and then rinse 1 Bottle 3     Allergies   Allergen Reactions    Pork/Porcine Containing Products Rash     [place]

## 2021-01-21 ENCOUNTER — TELEPHONE (OUTPATIENT)
Dept: FAMILY MEDICINE CLINIC | Age: 41
End: 2021-01-21

## 2021-01-21 NOTE — TELEPHONE ENCOUNTER
Spoke with pt's sister (on HIPAA)  She requests lab result letter sent   Results from 12-20 and 1-21

## 2021-01-21 NOTE — TELEPHONE ENCOUNTER
Pt sister would like the test results from 12/29 and 1/5 mailed to her before she comes to get other labs.  Please call sister back as soon as you send these

## 2021-01-27 DIAGNOSIS — E28.2 PCOS (POLYCYSTIC OVARIAN SYNDROME): ICD-10-CM

## 2021-01-27 RX ORDER — ACETAMINOPHEN AND CODEINE PHOSPHATE 120; 12 MG/5ML; MG/5ML
1 SOLUTION ORAL DAILY
Qty: 1 PACKAGE | Refills: 3 | Status: SHIPPED | OUTPATIENT
Start: 2021-01-27

## 2021-01-27 NOTE — TELEPHONE ENCOUNTER
The prescription for the NORETHINDRONE 0.35 MG needs to go to another Pharmacy because [facility] in [facility] do not except her insurance ay longer. Please send this prescription to [facility] on [address].  Thank You

## 2021-04-21 DIAGNOSIS — J06.9 VIRAL URI WITH COUGH: ICD-10-CM

## 2021-04-21 RX ORDER — LORATADINE 10 MG/1
TABLET ORAL
Qty: 30 TAB | Refills: 0 | Status: SHIPPED | OUTPATIENT
Start: 2021-04-21 | End: 2021-06-01 | Stop reason: SDUPTHER

## 2021-04-21 RX ORDER — FLUTICASONE PROPIONATE 50 MCG
2 SPRAY, SUSPENSION (ML) NASAL DAILY
Qty: 1 BOTTLE | Refills: 0 | Status: SHIPPED | OUTPATIENT
Start: 2021-04-21

## 2021-06-07 RX ORDER — LORATADINE 10 MG/1
TABLET ORAL
Qty: 30 TABLET | Refills: 0 | Status: SHIPPED | OUTPATIENT
Start: 2021-06-07

## 2021-06-07 NOTE — TELEPHONE ENCOUNTER
----- Message from Deejay Topete sent at 6/7/2021 11:38 AM EDT -----  Regarding: Dr. Lizama Puller: [phone]  General Message/Vendor Calls    Caller's first and last name: Pt's Sister      Reason for call: F/up on refill request for \"Loratadine\", on 6/01/21. Pharmacy has not received and pt has not received response from       Callback required yes/no and why:Yes, for status.       Best contact number(s): [phone]      Details to clarify the request: N/a      Deejay Topete

## 2023-05-10 RX ORDER — LORATADINE 10 MG/1
1 TABLET ORAL DAILY
COMMUNITY
Start: 2021-06-07

## 2023-05-10 RX ORDER — ACETAMINOPHEN AND CODEINE PHOSPHATE 120; 12 MG/5ML; MG/5ML
SOLUTION ORAL DAILY
COMMUNITY
Start: 2021-01-27

## 2023-05-10 RX ORDER — FLUTICASONE PROPIONATE 50 MCG
2 SPRAY, SUSPENSION (ML) NASAL DAILY
COMMUNITY
Start: 2021-04-21

## 2023-05-10 RX ORDER — KETOCONAZOLE 20 MG/ML
SHAMPOO TOPICAL
COMMUNITY
Start: 2018-09-19